# Patient Record
Sex: MALE | Race: WHITE
[De-identification: names, ages, dates, MRNs, and addresses within clinical notes are randomized per-mention and may not be internally consistent; named-entity substitution may affect disease eponyms.]

---

## 2019-09-27 ENCOUNTER — HOSPITAL ENCOUNTER (OUTPATIENT)
Dept: HOSPITAL 83 - LAB | Age: 69
Discharge: HOME | End: 2019-09-27
Attending: FAMILY MEDICINE
Payer: MEDICARE

## 2019-09-27 DIAGNOSIS — E78.2: ICD-10-CM

## 2019-09-27 DIAGNOSIS — I10: Primary | ICD-10-CM

## 2019-09-27 LAB
BUN SERPL-MCNC: 11 MG/DL (ref 7–24)
CHLORIDE SERPL-SCNC: 107 MMOL/L (ref 98–107)
CHOLEST SERPL-MCNC: 134 MG/DL (ref ?–200)
CREAT SERPL-MCNC: 1.08 MG/DL (ref 0.7–1.3)
HDLC SERPL-MCNC: 43 MG/DL (ref 40–60)
LDLC SERPL DIRECT ASSAY-MCNC: 61 MG/DL (ref 9–159)
POTASSIUM SERPL-SCNC: 3.6 MMOL/L (ref 3.5–5.1)
SODIUM SERPL-SCNC: 140 MMOL/L (ref 136–145)
TRIGL SERPL-MCNC: 149 MG/DL (ref ?–150)
VLDLC SERPL CALC-MCNC: 30 MG/DL (ref 6–40)

## 2021-04-20 ENCOUNTER — HOSPITAL ENCOUNTER (OUTPATIENT)
Dept: HOSPITAL 83 - LAB | Age: 71
Discharge: HOME | End: 2021-04-20
Attending: FAMILY MEDICINE
Payer: MEDICARE

## 2021-04-20 DIAGNOSIS — R53.83: ICD-10-CM

## 2021-04-20 DIAGNOSIS — I10: Primary | ICD-10-CM

## 2021-04-20 DIAGNOSIS — E78.2: ICD-10-CM

## 2021-04-20 DIAGNOSIS — R73.9: ICD-10-CM

## 2021-04-20 LAB
ALBUMIN SERPL-MCNC: 3.5 GM/DL (ref 3.1–4.5)
ALP SERPL-CCNC: 83 U/L (ref 45–117)
ALT SERPL W P-5'-P-CCNC: 28 U/L (ref 12–78)
AST SERPL-CCNC: 17 IU/L (ref 3–35)
BASOPHILS # BLD AUTO: 0.1 10*3/UL (ref 0–0.1)
BASOPHILS NFR BLD AUTO: 0.9 % (ref 0–1)
BUN SERPL-MCNC: 14 MG/DL (ref 7–24)
CHLORIDE SERPL-SCNC: 105 MMOL/L (ref 98–107)
CHOLEST SERPL-MCNC: 144 MG/DL (ref ?–200)
CREAT SERPL-MCNC: 1.09 MG/DL (ref 0.7–1.3)
EOSINOPHIL # BLD AUTO: 0.3 10*3/UL (ref 0–0.4)
EOSINOPHIL # BLD AUTO: 3.8 % (ref 1–4)
ERYTHROCYTE [DISTWIDTH] IN BLOOD BY AUTOMATED COUNT: 12.8 % (ref 0–14.5)
HCT VFR BLD AUTO: 39.4 % (ref 42–52)
HDLC SERPL-MCNC: 40 MG/DL (ref 40–60)
LDLC SERPL DIRECT ASSAY-MCNC: 32 MG/DL (ref 9–159)
LYMPHOCYTES # BLD AUTO: 1.9 10*3/UL (ref 1.3–4.4)
LYMPHOCYTES NFR BLD AUTO: 27.2 % (ref 27–41)
MCH RBC QN AUTO: 30.3 PG (ref 27–31)
MCHC RBC AUTO-ENTMCNC: 33.8 G/DL (ref 33–37)
MCV RBC AUTO: 89.7 FL (ref 80–94)
MONOCYTES # BLD AUTO: 0.6 10*3/UL (ref 0.1–1)
MONOCYTES NFR BLD MANUAL: 8.6 % (ref 3–9)
NEUT #: 4.1 10*3/UL (ref 2.3–7.9)
NEUT %: 59.2 % (ref 47–73)
NRBC BLD QL AUTO: 0 % (ref 0–0)
PLATELET # BLD AUTO: 212 10*3/UL (ref 130–400)
PMV BLD AUTO: 10.9 FL (ref 9.6–12.3)
POTASSIUM SERPL-SCNC: 3.4 MMOL/L (ref 3.5–5.1)
PROT SERPL-MCNC: 7.4 GM/DL (ref 6.4–8.2)
RBC # BLD AUTO: 4.39 10*6/UL (ref 4.5–5.9)
SODIUM SERPL-SCNC: 141 MMOL/L (ref 136–145)
TRIGL SERPL-MCNC: 362 MG/DL (ref ?–150)
TSH SERPL DL<=0.005 MIU/L-ACNC: 2.27 UIU/ML (ref 0.36–4.75)
VLDLC SERPL CALC-MCNC: 72 MG/DL (ref 6–40)
WBC NRBC COR # BLD AUTO: 6.8 10*3/UL (ref 4.8–10.8)

## 2021-05-04 ENCOUNTER — HOSPITAL ENCOUNTER (OUTPATIENT)
Dept: HOSPITAL 83 - US | Age: 71
Discharge: HOME | End: 2021-05-04
Attending: FAMILY MEDICINE
Payer: MEDICARE

## 2021-05-04 DIAGNOSIS — Z13.6: Primary | ICD-10-CM

## 2022-05-26 ENCOUNTER — HOSPITAL ENCOUNTER (OUTPATIENT)
Dept: HOSPITAL 83 - LAB | Age: 72
Discharge: HOME | End: 2022-05-26
Attending: FAMILY MEDICINE
Payer: MEDICARE

## 2022-05-26 DIAGNOSIS — I10: Primary | ICD-10-CM

## 2022-05-26 DIAGNOSIS — E78.2: ICD-10-CM

## 2022-05-26 LAB
BUN SERPL-MCNC: 17 MG/DL (ref 7–24)
CHLORIDE SERPL-SCNC: 111 MMOL/L (ref 98–107)
CHOLEST SERPL-MCNC: 118 MG/DL (ref ?–200)
CREAT SERPL-MCNC: 1.08 MG/DL (ref 0.7–1.3)
LDLC SERPL DIRECT ASSAY-MCNC: 32 MG/DL (ref 9–159)
POTASSIUM SERPL-SCNC: 4 MMOL/L (ref 3.5–5.1)
SODIUM SERPL-SCNC: 143 MMOL/L (ref 136–145)
TRIGL SERPL-MCNC: 202 MG/DL (ref ?–150)

## 2023-05-25 ENCOUNTER — HOSPITAL ENCOUNTER (OUTPATIENT)
Dept: HOSPITAL 83 - LAB | Age: 73
Discharge: HOME | End: 2023-05-25
Attending: FAMILY MEDICINE
Payer: MEDICARE

## 2023-05-25 DIAGNOSIS — I10: Primary | ICD-10-CM

## 2023-05-25 DIAGNOSIS — E78.2: ICD-10-CM

## 2023-05-25 LAB
BUN SERPL-MCNC: 13 MG/DL (ref 9–23)
CHLORIDE SERPL-SCNC: 106 MMOL/L (ref 98–107)
CHOLEST SERPL-MCNC: 116 MG/DL (ref ?–200)
LDLC SERPL DIRECT ASSAY-MCNC: 46 MG/DL (ref 9–159)
POTASSIUM SERPL-SCNC: 3.7 MMOL/L (ref 3.4–5.1)
TRIGL SERPL-MCNC: 137 MG/DL (ref ?–150)

## 2023-06-05 ENCOUNTER — HOSPITAL ENCOUNTER (OUTPATIENT)
Dept: HOSPITAL 83 - LAB | Age: 73
Discharge: HOME | End: 2023-06-05
Attending: FAMILY MEDICINE
Payer: MEDICARE

## 2023-06-05 DIAGNOSIS — R73.9: ICD-10-CM

## 2023-06-05 DIAGNOSIS — I10: Primary | ICD-10-CM

## 2023-06-05 LAB
BUN SERPL-MCNC: 16 MG/DL (ref 9–23)
CHLORIDE SERPL-SCNC: 107 MMOL/L (ref 98–107)
POTASSIUM SERPL-SCNC: 4.3 MMOL/L (ref 3.4–5.1)

## 2024-02-06 ENCOUNTER — APPOINTMENT (OUTPATIENT)
Dept: GENERAL RADIOLOGY | Age: 74
DRG: 460 | End: 2024-02-06
Payer: MEDICARE

## 2024-02-06 ENCOUNTER — APPOINTMENT (OUTPATIENT)
Dept: CT IMAGING | Age: 74
DRG: 460 | End: 2024-02-06
Payer: MEDICARE

## 2024-02-06 ENCOUNTER — HOSPITAL ENCOUNTER (EMERGENCY)
Dept: HOSPITAL 83 - ED | Age: 74
Discharge: ANOTHER HOSPITAL | End: 2024-02-06
Payer: MEDICARE

## 2024-02-06 ENCOUNTER — HOSPITAL ENCOUNTER (INPATIENT)
Age: 74
LOS: 5 days | Discharge: HOME | DRG: 460 | End: 2024-02-12
Attending: EMERGENCY MEDICINE | Admitting: SURGERY
Payer: MEDICARE

## 2024-02-06 VITALS — WEIGHT: 175 LBS | HEIGHT: 68.98 IN | BODY MASS INDEX: 25.92 KG/M2

## 2024-02-06 DIAGNOSIS — Z90.49: ICD-10-CM
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
## 2024-02-06 DIAGNOSIS — S63.004A: ICD-10-CM

## 2024-02-06 DIAGNOSIS — S32.018A: ICD-10-CM

## 2024-02-06 DIAGNOSIS — S32.000A COMPRESSION FRACTURE OF LUMBAR VERTEBRA, UNSPECIFIED LUMBAR VERTEBRAL LEVEL, INITIAL ENCOUNTER (HCC): ICD-10-CM

## 2024-02-06 DIAGNOSIS — M79.671: ICD-10-CM

## 2024-02-06 DIAGNOSIS — S62.101A CLOSED FRACTURE OF RIGHT WRIST, INITIAL ENCOUNTER: ICD-10-CM

## 2024-02-06 DIAGNOSIS — Z88.0: ICD-10-CM

## 2024-02-06 DIAGNOSIS — Z87.442: ICD-10-CM

## 2024-02-06 DIAGNOSIS — Z98.890: ICD-10-CM
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
## 2024-02-06 DIAGNOSIS — S52.514A: Primary | ICD-10-CM

## 2024-02-06 DIAGNOSIS — S09.90XA INJURY OF HEAD, INITIAL ENCOUNTER: Primary | ICD-10-CM
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
## 2024-02-06 LAB
ABO + RH BLD: NORMAL
ALBUMIN SERPL-MCNC: 4.3 G/DL (ref 3.5–5.2)
ALP SERPL-CCNC: 66 U/L (ref 46–116)
ALP SERPL-CCNC: 73 U/L (ref 40–129)
ALT SERPL W P-5'-P-CCNC: 27 U/L (ref 5–49)
ALT SERPL-CCNC: 30 U/L (ref 0–40)
AMPHET UR QL SCN: NEGATIVE
ANION GAP SERPL CALCULATED.3IONS-SCNC: 12 MMOL/L (ref 7–16)
APAP SERPL-MCNC: <5 UG/ML (ref 10–30)
APTT PPP: 23.6 SECONDS (ref 20–32.1)
ARM BAND NUMBER: NORMAL
AST SERPL-CCNC: 54 U/L (ref 0–39)
BACTERIA URNS QL MICRO: ABNORMAL
BARBITURATES UR QL SCN: NEGATIVE
BASOPHILS # BLD AUTO: 0.1 10*3/UL (ref 0–0.1)
BASOPHILS NFR BLD AUTO: 0.5 % (ref 0–1)
BENZODIAZ UR QL: NEGATIVE
BILIRUB SERPL-MCNC: 0.7 MG/DL (ref 0–1.2)
BILIRUB UR QL STRIP: NEGATIVE
BLOOD BANK SAMPLE EXPIRATION: NORMAL
BLOOD GROUP ANTIBODIES SERPL: NEGATIVE
BUN SERPL-MCNC: 13 MG/DL (ref 9–23)
BUN SERPL-MCNC: 16 MG/DL (ref 6–23)
BUPRENORPHINE UR QL: NEGATIVE
CALCIUM SERPL-MCNC: 8.7 MG/DL (ref 8.6–10.2)
CANNABINOIDS UR QL SCN: POSITIVE
CHLORIDE SERPL-SCNC: 105 MMOL/L (ref 98–107)
CHLORIDE SERPL-SCNC: 108 MMOL/L (ref 98–107)
CLARITY UR: CLEAR
CO2 SERPL-SCNC: 24 MMOL/L (ref 22–29)
COCAINE UR QL SCN: NEGATIVE
COLOR UR: YELLOW
CREAT SERPL-MCNC: 0.9 MG/DL (ref 0.7–1.2)
EOSINOPHIL # BLD AUTO: 0.2 10*3/UL (ref 0–0.4)
EOSINOPHIL # BLD AUTO: 1.2 % (ref 1–4)
ERYTHROCYTE [DISTWIDTH] IN BLOOD BY AUTOMATED COUNT: 12.9 % (ref 0–14.5)
ERYTHROCYTE [DISTWIDTH] IN BLOOD BY AUTOMATED COUNT: 12.9 % (ref 11.5–15)
ETHANOLAMINE SERPL-MCNC: <10 MG/DL
FENTANYL UR QL: POSITIVE
GFR SERPL CREATININE-BSD FRML MDRD: >60 ML/MIN/1.73M2
GLUCOSE SERPL-MCNC: 153 MG/DL (ref 74–99)
GLUCOSE UR STRIP-MCNC: NEGATIVE MG/DL
HCT VFR BLD AUTO: 35.3 % (ref 37–54)
HCT VFR BLD AUTO: 38.1 % (ref 42–52)
HGB BLD-MCNC: 12 G/DL (ref 12.5–16.5)
HGB UR QL STRIP.AUTO: ABNORMAL
INR PPP: 1.2
KETONES UR STRIP-MCNC: 15 MG/DL
LACTATE BLDV-SCNC: 1.4 MMOL/L (ref 0.5–2.2)
LEUKOCYTE ESTERASE UR QL STRIP: NEGATIVE
LIPASE SERPL-CCNC: 40 U/L (ref 12–53)
LYMPHOCYTES # BLD AUTO: 1.2 10*3/UL (ref 1.3–4.4)
LYMPHOCYTES NFR BLD AUTO: 9 % (ref 27–41)
MCH RBC QN AUTO: 29.9 PG (ref 26–35)
MCH RBC QN AUTO: 30 PG (ref 27–31)
MCHC RBC AUTO-ENTMCNC: 33.6 G/DL (ref 33–37)
MCHC RBC AUTO-ENTMCNC: 34 G/DL (ref 32–34.5)
MCV RBC AUTO: 87.8 FL (ref 80–99.9)
MCV RBC AUTO: 89.2 FL (ref 80–94)
METHADONE UR QL: NEGATIVE
MONOCYTES # BLD AUTO: 0.6 10*3/UL (ref 0.1–1)
MONOCYTES NFR BLD MANUAL: 4.2 % (ref 3–9)
NEUT #: 10.9 10*3/UL (ref 2.3–7.9)
NEUT %: 84.5 % (ref 47–73)
NITRITE UR QL STRIP: NEGATIVE
NRBC BLD QL AUTO: 0 % (ref 0–0)
OPIATES UR QL SCN: NEGATIVE
OXYCODONE UR QL SCN: NEGATIVE
PCP UR QL SCN: NEGATIVE
PH UR STRIP: 6 [PH] (ref 5–9)
PLATELET # BLD AUTO: 155 10*3/UL (ref 130–400)
PLATELET # BLD AUTO: 171 K/UL (ref 130–450)
PMV BLD AUTO: 11 FL (ref 7–12)
PMV BLD AUTO: 11 FL (ref 9.6–12.3)
POTASSIUM SERPL-SCNC: 3.4 MMOL/L (ref 3.4–5.1)
POTASSIUM SERPL-SCNC: 3.4 MMOL/L (ref 3.5–5)
PROT SERPL-MCNC: 6.8 GM/DL (ref 6–8)
PROT SERPL-MCNC: 6.9 G/DL (ref 6.4–8.3)
PROT UR STRIP-MCNC: ABNORMAL MG/DL
PROTHROMBIN TIME: 13 SEC (ref 9.3–12.4)
RBC # BLD AUTO: 4.02 M/UL (ref 3.8–5.8)
RBC # BLD AUTO: 4.27 10*6/UL (ref 4.5–5.9)
RBC #/AREA URNS HPF: ABNORMAL /HPF
SALICYLATES SERPL-MCNC: <0.3 MG/DL (ref 0–30)
SODIUM SERPL-SCNC: 141 MMOL/L (ref 132–146)
SP GR UR STRIP: 1.02 (ref 1–1.03)
TEST INFORMATION: ABNORMAL
TOXIC TRICYCLIC SC,BLOOD: NEGATIVE
UROBILINOGEN UR STRIP-ACNC: 0.2 EU/DL (ref 0–1)
WBC #/AREA URNS HPF: ABNORMAL /HPF
WBC NRBC COR # BLD AUTO: 13 10*3/UL (ref 4.8–10.8)
WBC OTHER # BLD: 12.3 K/UL (ref 4.5–11.5)

## 2024-02-06 PROCEDURE — G0480 DRUG TEST DEF 1-7 CLASSES: HCPCS

## 2024-02-06 PROCEDURE — 80053 COMPREHEN METABOLIC PANEL: CPT

## 2024-02-06 PROCEDURE — 96374 THER/PROPH/DIAG INJ IV PUSH: CPT

## 2024-02-06 PROCEDURE — 85027 COMPLETE CBC AUTOMATED: CPT

## 2024-02-06 PROCEDURE — 73110 X-RAY EXAM OF WRIST: CPT

## 2024-02-06 PROCEDURE — 83605 ASSAY OF LACTIC ACID: CPT

## 2024-02-06 PROCEDURE — 86850 RBC ANTIBODY SCREEN: CPT

## 2024-02-06 PROCEDURE — 72170 X-RAY EXAM OF PELVIS: CPT

## 2024-02-06 PROCEDURE — 80307 DRUG TEST PRSMV CHEM ANLYZR: CPT

## 2024-02-06 PROCEDURE — 86900 BLOOD TYPING SEROLOGIC ABO: CPT

## 2024-02-06 PROCEDURE — 85610 PROTHROMBIN TIME: CPT

## 2024-02-06 PROCEDURE — 73090 X-RAY EXAM OF FOREARM: CPT

## 2024-02-06 PROCEDURE — 86901 BLOOD TYPING SEROLOGIC RH(D): CPT

## 2024-02-06 PROCEDURE — 99285 EMERGENCY DEPT VISIT HI MDM: CPT

## 2024-02-06 PROCEDURE — 80179 DRUG ASSAY SALICYLATE: CPT

## 2024-02-06 PROCEDURE — 93005 ELECTROCARDIOGRAM TRACING: CPT

## 2024-02-06 PROCEDURE — 80143 DRUG ASSAY ACETAMINOPHEN: CPT

## 2024-02-06 PROCEDURE — 99223 1ST HOSP IP/OBS HIGH 75: CPT | Performed by: SURGERY

## 2024-02-06 PROCEDURE — 6370000000 HC RX 637 (ALT 250 FOR IP)

## 2024-02-06 PROCEDURE — 81001 URINALYSIS AUTO W/SCOPE: CPT

## 2024-02-06 PROCEDURE — 99221 1ST HOSP IP/OBS SF/LOW 40: CPT | Performed by: ORTHOPAEDIC SURGERY

## 2024-02-06 PROCEDURE — 71045 X-RAY EXAM CHEST 1 VIEW: CPT

## 2024-02-06 RX ORDER — PROPOFOL 10 MG/ML
INJECTION, EMULSION INTRAVENOUS
Status: DISPENSED
Start: 2024-02-06 | End: 2024-02-07

## 2024-02-06 RX ORDER — OXYCODONE HYDROCHLORIDE 5 MG/1
5 TABLET ORAL EVERY 4 HOURS PRN
Status: DISCONTINUED | OUTPATIENT
Start: 2024-02-06 | End: 2024-02-12 | Stop reason: HOSPADM

## 2024-02-06 RX ORDER — ACETAMINOPHEN 325 MG/1
650 TABLET ORAL EVERY 6 HOURS
Status: DISCONTINUED | OUTPATIENT
Start: 2024-02-06 | End: 2024-02-07

## 2024-02-06 RX ORDER — OXYCODONE HYDROCHLORIDE 10 MG/1
10 TABLET ORAL EVERY 4 HOURS PRN
Status: DISCONTINUED | OUTPATIENT
Start: 2024-02-06 | End: 2024-02-12 | Stop reason: HOSPADM

## 2024-02-06 RX ADMIN — OXYCODONE HYDROCHLORIDE 10 MG: 10 TABLET ORAL at 20:49

## 2024-02-07 ENCOUNTER — ANESTHESIA (OUTPATIENT)
Dept: OPERATING ROOM | Age: 74
End: 2024-02-07
Payer: MEDICARE

## 2024-02-07 ENCOUNTER — APPOINTMENT (OUTPATIENT)
Dept: GENERAL RADIOLOGY | Age: 74
DRG: 460 | End: 2024-02-07
Payer: MEDICARE

## 2024-02-07 ENCOUNTER — APPOINTMENT (OUTPATIENT)
Dept: CT IMAGING | Age: 74
DRG: 460 | End: 2024-02-07
Payer: MEDICARE

## 2024-02-07 ENCOUNTER — ANESTHESIA EVENT (OUTPATIENT)
Dept: OPERATING ROOM | Age: 74
End: 2024-02-07
Payer: MEDICARE

## 2024-02-07 PROBLEM — S99.921A RIGHT FOOT INJURY, INITIAL ENCOUNTER: Status: ACTIVE | Noted: 2024-02-07

## 2024-02-07 PROBLEM — S62.121A: Status: ACTIVE | Noted: 2024-02-07

## 2024-02-07 PROBLEM — S62.101A CLOSED FRACTURE OF RIGHT WRIST: Status: ACTIVE | Noted: 2024-02-07

## 2024-02-07 PROBLEM — T14.90XA TRAUMA: Status: ACTIVE | Noted: 2024-02-07

## 2024-02-07 PROBLEM — S09.90XA HEAD INJURY: Status: ACTIVE | Noted: 2024-02-07

## 2024-02-07 PROBLEM — S32.001A CLOSED BURST FRACTURE OF LUMBAR VERTEBRA (HCC): Status: ACTIVE | Noted: 2024-02-07

## 2024-02-07 LAB
ANION GAP SERPL CALCULATED.3IONS-SCNC: 10 MMOL/L (ref 7–16)
BUN SERPL-MCNC: 16 MG/DL (ref 6–23)
CALCIUM SERPL-MCNC: 8.1 MG/DL (ref 8.6–10.2)
CHLORIDE SERPL-SCNC: 105 MMOL/L (ref 98–107)
CO2 SERPL-SCNC: 23 MMOL/L (ref 22–29)
CREAT SERPL-MCNC: 0.8 MG/DL (ref 0.7–1.2)
EKG ATRIAL RATE: 74 BPM
EKG P AXIS: 38 DEGREES
EKG P-R INTERVAL: 150 MS
EKG Q-T INTERVAL: 448 MS
EKG QRS DURATION: 108 MS
EKG QTC CALCULATION (BAZETT): 497 MS
EKG R AXIS: 47 DEGREES
EKG T AXIS: 64 DEGREES
EKG VENTRICULAR RATE: 74 BPM
GFR SERPL CREATININE-BSD FRML MDRD: >60 ML/MIN/1.73M2
GLUCOSE SERPL-MCNC: 152 MG/DL (ref 74–99)
POTASSIUM SERPL-SCNC: 3.9 MMOL/L (ref 3.5–5)
SODIUM SERPL-SCNC: 138 MMOL/L (ref 132–146)

## 2024-02-07 PROCEDURE — 80048 BASIC METABOLIC PNL TOTAL CA: CPT

## 2024-02-07 PROCEDURE — 72125 CT NECK SPINE W/O DYE: CPT

## 2024-02-07 PROCEDURE — 6360000002 HC RX W HCPCS: Performed by: NEUROLOGICAL SURGERY

## 2024-02-07 PROCEDURE — 6370000000 HC RX 637 (ALT 250 FOR IP): Performed by: NEUROLOGICAL SURGERY

## 2024-02-07 PROCEDURE — 72128 CT CHEST SPINE W/O DYE: CPT

## 2024-02-07 PROCEDURE — 6370000000 HC RX 637 (ALT 250 FOR IP)

## 2024-02-07 PROCEDURE — 0SG1071 FUSION OF 2 OR MORE LUMBAR VERTEBRAL JOINTS WITH AUTOLOGOUS TISSUE SUBSTITUTE, POSTERIOR APPROACH, POSTERIOR COLUMN, OPEN APPROACH: ICD-10-PCS | Performed by: NEUROLOGICAL SURGERY

## 2024-02-07 PROCEDURE — 2580000003 HC RX 258

## 2024-02-07 PROCEDURE — 2500000003 HC RX 250 WO HCPCS

## 2024-02-07 PROCEDURE — 6360000002 HC RX W HCPCS

## 2024-02-07 PROCEDURE — 74177 CT ABD & PELVIS W/CONTRAST: CPT

## 2024-02-07 PROCEDURE — 7100000000 HC PACU RECOVERY - FIRST 15 MIN: Performed by: NEUROLOGICAL SURGERY

## 2024-02-07 PROCEDURE — 93010 ELECTROCARDIOGRAM REPORT: CPT | Performed by: INTERNAL MEDICINE

## 2024-02-07 PROCEDURE — 2720000010 HC SURG SUPPLY STERILE: Performed by: NEUROLOGICAL SURGERY

## 2024-02-07 PROCEDURE — A4217 STERILE WATER/SALINE, 500 ML: HCPCS | Performed by: NEUROLOGICAL SURGERY

## 2024-02-07 PROCEDURE — 95910 NRV CNDJ TEST 7-8 STUDIES: CPT | Performed by: AUDIOLOGIST

## 2024-02-07 PROCEDURE — 2580000003 HC RX 258: Performed by: NEUROLOGICAL SURGERY

## 2024-02-07 PROCEDURE — 72131 CT LUMBAR SPINE W/O DYE: CPT

## 2024-02-07 PROCEDURE — 22842 INSERT SPINE FIXATION DEVICE: CPT | Performed by: NEUROLOGICAL SURGERY

## 2024-02-07 PROCEDURE — C1713 ANCHOR/SCREW BN/BN,TIS/BN: HCPCS | Performed by: NEUROLOGICAL SURGERY

## 2024-02-07 PROCEDURE — 3600000015 HC SURGERY LEVEL 5 ADDTL 15MIN: Performed by: NEUROLOGICAL SURGERY

## 2024-02-07 PROCEDURE — 3700000000 HC ANESTHESIA ATTENDED CARE: Performed by: NEUROLOGICAL SURGERY

## 2024-02-07 PROCEDURE — 36415 COLL VENOUS BLD VENIPUNCTURE: CPT

## 2024-02-07 PROCEDURE — 7100000001 HC PACU RECOVERY - ADDTL 15 MIN: Performed by: NEUROLOGICAL SURGERY

## 2024-02-07 PROCEDURE — 99222 1ST HOSP IP/OBS MODERATE 55: CPT | Performed by: NEUROLOGICAL SURGERY

## 2024-02-07 PROCEDURE — 2500000003 HC RX 250 WO HCPCS: Performed by: ANESTHESIOLOGY

## 2024-02-07 PROCEDURE — 63005 REMOVE SPINE LAMINA 1/2 LMBR: CPT | Performed by: NEUROLOGICAL SURGERY

## 2024-02-07 PROCEDURE — 70450 CT HEAD/BRAIN W/O DYE: CPT

## 2024-02-07 PROCEDURE — 22614 ARTHRD PST TQ 1NTRSPC EA ADD: CPT | Performed by: NEUROLOGICAL SURGERY

## 2024-02-07 PROCEDURE — 0RGA071 FUSION OF THORACOLUMBAR VERTEBRAL JOINT WITH AUTOLOGOUS TISSUE SUBSTITUTE, POSTERIOR APPROACH, POSTERIOR COLUMN, OPEN APPROACH: ICD-10-PCS | Performed by: NEUROLOGICAL SURGERY

## 2024-02-07 PROCEDURE — 71260 CT THORAX DX C+: CPT

## 2024-02-07 PROCEDURE — 22614 ARTHRD PST TQ 1NTRSPC EA ADD: CPT | Performed by: PHYSICIAN ASSISTANT

## 2024-02-07 PROCEDURE — 3600000005 HC SURGERY LEVEL 5 BASE: Performed by: NEUROLOGICAL SURGERY

## 2024-02-07 PROCEDURE — 61783 SCAN PROC SPINAL: CPT | Performed by: NEUROLOGICAL SURGERY

## 2024-02-07 PROCEDURE — 0RG6071 FUSION OF THORACIC VERTEBRAL JOINT WITH AUTOLOGOUS TISSUE SUBSTITUTE, POSTERIOR APPROACH, POSTERIOR COLUMN, OPEN APPROACH: ICD-10-PCS | Performed by: NEUROLOGICAL SURGERY

## 2024-02-07 PROCEDURE — 2060000000 HC ICU INTERMEDIATE R&B

## 2024-02-07 PROCEDURE — 3700000001 HC ADD 15 MINUTES (ANESTHESIA): Performed by: NEUROLOGICAL SURGERY

## 2024-02-07 PROCEDURE — 2500000003 HC RX 250 WO HCPCS: Performed by: NEUROLOGICAL SURGERY

## 2024-02-07 PROCEDURE — 6360000004 HC RX CONTRAST MEDICATION: Performed by: RADIOLOGY

## 2024-02-07 PROCEDURE — C1729 CATH, DRAINAGE: HCPCS | Performed by: NEUROLOGICAL SURGERY

## 2024-02-07 PROCEDURE — 95940 IONM IN OPERATNG ROOM 15 MIN: CPT | Performed by: AUDIOLOGIST

## 2024-02-07 PROCEDURE — 73630 X-RAY EXAM OF FOOT: CPT

## 2024-02-07 PROCEDURE — 2709999900 HC NON-CHARGEABLE SUPPLY: Performed by: NEUROLOGICAL SURGERY

## 2024-02-07 PROCEDURE — 99231 SBSQ HOSP IP/OBS SF/LOW 25: CPT | Performed by: ORTHOPAEDIC SURGERY

## 2024-02-07 PROCEDURE — 22610 ARTHRD PST TQ 1NTRSPC THRC: CPT | Performed by: NEUROLOGICAL SURGERY

## 2024-02-07 PROCEDURE — 73200 CT UPPER EXTREMITY W/O DYE: CPT

## 2024-02-07 PROCEDURE — 22610 ARTHRD PST TQ 1NTRSPC THRC: CPT | Performed by: PHYSICIAN ASSISTANT

## 2024-02-07 DEVICE — CREO® THREADED 6.5 X 45MM POLYAXIAL SCREW
Type: IMPLANTABLE DEVICE | Site: SPINE LUMBAR | Status: FUNCTIONAL
Brand: CREO

## 2024-02-07 DEVICE — CREO® THREADED 6.5 X 40MM POLYAXIAL SCREW
Type: IMPLANTABLE DEVICE | Site: SPINE LUMBAR | Status: FUNCTIONAL
Brand: CREO

## 2024-02-07 DEVICE — SUBSTITUTE BNE GRFT 100 X 10 X 5 MM 5 CC DEMINERALIZED: Type: IMPLANTABLE DEVICE | Site: SPINE LUMBAR | Status: FUNCTIONAL

## 2024-02-07 DEVICE — THREADED LOCKING CAP, CREO
Type: IMPLANTABLE DEVICE | Site: SPINE LUMBAR | Status: FUNCTIONAL
Brand: CREO

## 2024-02-07 DEVICE — GRAFT BNE SUB 30CC 1.7-10MM CANC CHIP MORSELIZED FRZ DRY: Type: IMPLANTABLE DEVICE | Site: SPINE LUMBAR | Status: FUNCTIONAL

## 2024-02-07 DEVICE — CREO® THREADED 6.5 X 50MM POLYAXIAL SCREW
Type: IMPLANTABLE DEVICE | Site: SPINE LUMBAR | Status: FUNCTIONAL
Brand: CREO

## 2024-02-07 DEVICE — 5.5MM STRAIGHT ROD, TITANIUM ALLOY, 150MM LENGTH
Type: IMPLANTABLE DEVICE | Site: SPINE LUMBAR | Status: FUNCTIONAL
Brand: CREO

## 2024-02-07 RX ORDER — MORPHINE SULFATE 4 MG/ML
4 INJECTION, SOLUTION INTRAMUSCULAR; INTRAVENOUS
Status: DISCONTINUED | OUTPATIENT
Start: 2024-02-07 | End: 2024-02-08

## 2024-02-07 RX ORDER — KETAMINE HCL IN NACL, ISO-OSM 100MG/10ML
SYRINGE (ML) INJECTION PRN
Status: DISCONTINUED | OUTPATIENT
Start: 2024-02-07 | End: 2024-02-07 | Stop reason: SDUPTHER

## 2024-02-07 RX ORDER — SUCCINYLCHOLINE/SOD CL,ISO/PF 200MG/10ML
SYRINGE (ML) INTRAVENOUS PRN
Status: DISCONTINUED | OUTPATIENT
Start: 2024-02-07 | End: 2024-02-07 | Stop reason: SDUPTHER

## 2024-02-07 RX ORDER — ONDANSETRON 4 MG/1
4 TABLET, ORALLY DISINTEGRATING ORAL EVERY 8 HOURS PRN
Status: DISCONTINUED | OUTPATIENT
Start: 2024-02-07 | End: 2024-02-12 | Stop reason: HOSPADM

## 2024-02-07 RX ORDER — SODIUM CHLORIDE 0.9 % (FLUSH) 0.9 %
5-40 SYRINGE (ML) INJECTION EVERY 12 HOURS SCHEDULED
Status: DISCONTINUED | OUTPATIENT
Start: 2024-02-07 | End: 2024-02-12 | Stop reason: HOSPADM

## 2024-02-07 RX ORDER — ONDANSETRON 2 MG/ML
4 INJECTION INTRAMUSCULAR; INTRAVENOUS
Status: DISCONTINUED | OUTPATIENT
Start: 2024-02-07 | End: 2024-02-07 | Stop reason: HOSPADM

## 2024-02-07 RX ORDER — LIDOCAINE HYDROCHLORIDE AND EPINEPHRINE 5; 5 MG/ML; UG/ML
INJECTION, SOLUTION INFILTRATION; PERINEURAL PRN
Status: DISCONTINUED | OUTPATIENT
Start: 2024-02-07 | End: 2024-02-07 | Stop reason: ALTCHOICE

## 2024-02-07 RX ORDER — HYDROMORPHONE HYDROCHLORIDE 1 MG/ML
0.5 INJECTION, SOLUTION INTRAMUSCULAR; INTRAVENOUS; SUBCUTANEOUS EVERY 5 MIN PRN
Status: DISCONTINUED | OUTPATIENT
Start: 2024-02-07 | End: 2024-02-07 | Stop reason: HOSPADM

## 2024-02-07 RX ORDER — SODIUM CHLORIDE 9 MG/ML
INJECTION, SOLUTION INTRAVENOUS PRN
Status: DISCONTINUED | OUTPATIENT
Start: 2024-02-07 | End: 2024-02-07 | Stop reason: HOSPADM

## 2024-02-07 RX ORDER — OXYCODONE HYDROCHLORIDE 10 MG/1
10 TABLET ORAL EVERY 4 HOURS PRN
Status: DISCONTINUED | OUTPATIENT
Start: 2024-02-07 | End: 2024-02-07

## 2024-02-07 RX ORDER — ONDANSETRON 2 MG/ML
4 INJECTION INTRAMUSCULAR; INTRAVENOUS EVERY 6 HOURS PRN
Status: DISCONTINUED | OUTPATIENT
Start: 2024-02-07 | End: 2024-02-12 | Stop reason: HOSPADM

## 2024-02-07 RX ORDER — SODIUM CHLORIDE 0.9 % (FLUSH) 0.9 %
5-40 SYRINGE (ML) INJECTION PRN
Status: DISCONTINUED | OUTPATIENT
Start: 2024-02-07 | End: 2024-02-12 | Stop reason: HOSPADM

## 2024-02-07 RX ORDER — LIDOCAINE HYDROCHLORIDE 20 MG/ML
INJECTION, SOLUTION INTRAVENOUS PRN
Status: DISCONTINUED | OUTPATIENT
Start: 2024-02-07 | End: 2024-02-07 | Stop reason: SDUPTHER

## 2024-02-07 RX ORDER — SODIUM CHLORIDE 0.9 % (FLUSH) 0.9 %
5-40 SYRINGE (ML) INJECTION EVERY 12 HOURS SCHEDULED
Status: DISCONTINUED | OUTPATIENT
Start: 2024-02-07 | End: 2024-02-07 | Stop reason: HOSPADM

## 2024-02-07 RX ORDER — SENNA AND DOCUSATE SODIUM 50; 8.6 MG/1; MG/1
1 TABLET, FILM COATED ORAL 2 TIMES DAILY
Status: DISCONTINUED | OUTPATIENT
Start: 2024-02-07 | End: 2024-02-12 | Stop reason: HOSPADM

## 2024-02-07 RX ORDER — MIDAZOLAM HYDROCHLORIDE 1 MG/ML
INJECTION INTRAMUSCULAR; INTRAVENOUS PRN
Status: DISCONTINUED | OUTPATIENT
Start: 2024-02-07 | End: 2024-02-07 | Stop reason: SDUPTHER

## 2024-02-07 RX ORDER — POLYETHYLENE GLYCOL 3350 17 G/17G
17 POWDER, FOR SOLUTION ORAL DAILY
Status: DISCONTINUED | OUTPATIENT
Start: 2024-02-07 | End: 2024-02-12 | Stop reason: HOSPADM

## 2024-02-07 RX ORDER — SODIUM CHLORIDE, SODIUM LACTATE, POTASSIUM CHLORIDE, CALCIUM CHLORIDE 600; 310; 30; 20 MG/100ML; MG/100ML; MG/100ML; MG/100ML
INJECTION, SOLUTION INTRAVENOUS CONTINUOUS
Status: DISCONTINUED | OUTPATIENT
Start: 2024-02-07 | End: 2024-02-07

## 2024-02-07 RX ORDER — SODIUM CHLORIDE 0.9 % (FLUSH) 0.9 %
10 SYRINGE (ML) INJECTION EVERY 12 HOURS SCHEDULED
Status: DISCONTINUED | OUTPATIENT
Start: 2024-02-07 | End: 2024-02-12 | Stop reason: HOSPADM

## 2024-02-07 RX ORDER — METHOCARBAMOL 500 MG/1
1000 TABLET, FILM COATED ORAL 4 TIMES DAILY
Status: DISCONTINUED | OUTPATIENT
Start: 2024-02-07 | End: 2024-02-12 | Stop reason: HOSPADM

## 2024-02-07 RX ORDER — SODIUM CHLORIDE, SODIUM LACTATE, POTASSIUM CHLORIDE, CALCIUM CHLORIDE 600; 310; 30; 20 MG/100ML; MG/100ML; MG/100ML; MG/100ML
INJECTION, SOLUTION INTRAVENOUS CONTINUOUS PRN
Status: DISCONTINUED | OUTPATIENT
Start: 2024-02-07 | End: 2024-02-07 | Stop reason: SDUPTHER

## 2024-02-07 RX ORDER — ONDANSETRON 2 MG/ML
INJECTION INTRAMUSCULAR; INTRAVENOUS PRN
Status: DISCONTINUED | OUTPATIENT
Start: 2024-02-07 | End: 2024-02-07 | Stop reason: SDUPTHER

## 2024-02-07 RX ORDER — HEPARIN SODIUM 10000 [USP'U]/ML
5000 INJECTION, SOLUTION INTRAVENOUS; SUBCUTANEOUS EVERY 8 HOURS SCHEDULED
Status: DISCONTINUED | OUTPATIENT
Start: 2024-02-07 | End: 2024-02-07

## 2024-02-07 RX ORDER — SODIUM CHLORIDE 9 MG/ML
INJECTION, SOLUTION INTRAVENOUS PRN
Status: DISCONTINUED | OUTPATIENT
Start: 2024-02-07 | End: 2024-02-12 | Stop reason: HOSPADM

## 2024-02-07 RX ORDER — SODIUM CHLORIDE 0.9 % (FLUSH) 0.9 %
5-40 SYRINGE (ML) INJECTION PRN
Status: DISCONTINUED | OUTPATIENT
Start: 2024-02-07 | End: 2024-02-07 | Stop reason: HOSPADM

## 2024-02-07 RX ORDER — KETOROLAC TROMETHAMINE 30 MG/ML
15 INJECTION, SOLUTION INTRAMUSCULAR; INTRAVENOUS EVERY 6 HOURS PRN
Status: DISCONTINUED | OUTPATIENT
Start: 2024-02-07 | End: 2024-02-07

## 2024-02-07 RX ORDER — LABETALOL HYDROCHLORIDE 5 MG/ML
INJECTION, SOLUTION INTRAVENOUS PRN
Status: DISCONTINUED | OUTPATIENT
Start: 2024-02-07 | End: 2024-02-07 | Stop reason: SDUPTHER

## 2024-02-07 RX ORDER — VANCOMYCIN HYDROCHLORIDE 1 G/20ML
INJECTION, POWDER, LYOPHILIZED, FOR SOLUTION INTRAVENOUS PRN
Status: DISCONTINUED | OUTPATIENT
Start: 2024-02-07 | End: 2024-02-07 | Stop reason: ALTCHOICE

## 2024-02-07 RX ORDER — FENTANYL CITRATE 50 UG/ML
INJECTION, SOLUTION INTRAMUSCULAR; INTRAVENOUS PRN
Status: DISCONTINUED | OUTPATIENT
Start: 2024-02-07 | End: 2024-02-07 | Stop reason: SDUPTHER

## 2024-02-07 RX ORDER — HYDROMORPHONE HYDROCHLORIDE 1 MG/ML
0.25 INJECTION, SOLUTION INTRAMUSCULAR; INTRAVENOUS; SUBCUTANEOUS EVERY 5 MIN PRN
Status: DISCONTINUED | OUTPATIENT
Start: 2024-02-07 | End: 2024-02-07 | Stop reason: HOSPADM

## 2024-02-07 RX ORDER — DEXAMETHASONE SODIUM PHOSPHATE 10 MG/ML
INJECTION INTRAMUSCULAR; INTRAVENOUS PRN
Status: DISCONTINUED | OUTPATIENT
Start: 2024-02-07 | End: 2024-02-07 | Stop reason: SDUPTHER

## 2024-02-07 RX ORDER — ROCURONIUM BROMIDE 10 MG/ML
INJECTION, SOLUTION INTRAVENOUS PRN
Status: DISCONTINUED | OUTPATIENT
Start: 2024-02-07 | End: 2024-02-07 | Stop reason: SDUPTHER

## 2024-02-07 RX ORDER — BUPIVACAINE HYDROCHLORIDE 2.5 MG/ML
INJECTION, SOLUTION EPIDURAL; INFILTRATION; INTRACAUDAL PRN
Status: DISCONTINUED | OUTPATIENT
Start: 2024-02-07 | End: 2024-02-07 | Stop reason: ALTCHOICE

## 2024-02-07 RX ORDER — BACITRACIN ZINC 500 [USP'U]/G
OINTMENT TOPICAL PRN
Status: DISCONTINUED | OUTPATIENT
Start: 2024-02-07 | End: 2024-02-07 | Stop reason: ALTCHOICE

## 2024-02-07 RX ORDER — ACETAMINOPHEN 325 MG/1
650 TABLET ORAL EVERY 6 HOURS
Status: DISCONTINUED | OUTPATIENT
Start: 2024-02-07 | End: 2024-02-12 | Stop reason: HOSPADM

## 2024-02-07 RX ORDER — PHENYLEPHRINE HCL IN 0.9% NACL 1 MG/10 ML
SYRINGE (ML) INTRAVENOUS PRN
Status: DISCONTINUED | OUTPATIENT
Start: 2024-02-07 | End: 2024-02-07 | Stop reason: SDUPTHER

## 2024-02-07 RX ORDER — ONDANSETRON 4 MG/1
4 TABLET, ORALLY DISINTEGRATING ORAL EVERY 8 HOURS PRN
Status: DISCONTINUED | OUTPATIENT
Start: 2024-02-07 | End: 2024-02-07

## 2024-02-07 RX ORDER — ONDANSETRON 2 MG/ML
4 INJECTION INTRAMUSCULAR; INTRAVENOUS EVERY 6 HOURS PRN
Status: DISCONTINUED | OUTPATIENT
Start: 2024-02-07 | End: 2024-02-07

## 2024-02-07 RX ORDER — SODIUM CHLORIDE 0.9 % (FLUSH) 0.9 %
10 SYRINGE (ML) INJECTION PRN
Status: DISCONTINUED | OUTPATIENT
Start: 2024-02-07 | End: 2024-02-12 | Stop reason: HOSPADM

## 2024-02-07 RX ORDER — PROPOFOL 10 MG/ML
INJECTION, EMULSION INTRAVENOUS PRN
Status: DISCONTINUED | OUTPATIENT
Start: 2024-02-07 | End: 2024-02-07 | Stop reason: SDUPTHER

## 2024-02-07 RX ADMIN — ACETAMINOPHEN 650 MG: 325 TABLET ORAL at 19:57

## 2024-02-07 RX ADMIN — SODIUM CHLORIDE, PRESERVATIVE FREE 10 ML: 5 INJECTION INTRAVENOUS at 19:58

## 2024-02-07 RX ADMIN — ROCURONIUM BROMIDE 40 MG: 10 INJECTION, SOLUTION INTRAVENOUS at 10:05

## 2024-02-07 RX ADMIN — ACETAMINOPHEN 650 MG: 325 TABLET ORAL at 08:17

## 2024-02-07 RX ADMIN — Medication 100 MCG: at 11:23

## 2024-02-07 RX ADMIN — SODIUM CHLORIDE, POTASSIUM CHLORIDE, SODIUM LACTATE AND CALCIUM CHLORIDE: 600; 310; 30; 20 INJECTION, SOLUTION INTRAVENOUS at 04:51

## 2024-02-07 RX ADMIN — HYDROMORPHONE HYDROCHLORIDE 0.5 MG: 1 INJECTION, SOLUTION INTRAMUSCULAR; INTRAVENOUS; SUBCUTANEOUS at 08:18

## 2024-02-07 RX ADMIN — ACETAMINOPHEN 650 MG: 325 TABLET ORAL at 17:02

## 2024-02-07 RX ADMIN — SODIUM CHLORIDE, POTASSIUM CHLORIDE, SODIUM LACTATE AND CALCIUM CHLORIDE: 600; 310; 30; 20 INJECTION, SOLUTION INTRAVENOUS at 09:43

## 2024-02-07 RX ADMIN — LIDOCAINE HYDROCHLORIDE 40 MG: 20 INJECTION, SOLUTION INTRAVENOUS at 09:57

## 2024-02-07 RX ADMIN — HYDROMORPHONE HYDROCHLORIDE 0.5 MG: 1 INJECTION, SOLUTION INTRAMUSCULAR; INTRAVENOUS; SUBCUTANEOUS at 14:20

## 2024-02-07 RX ADMIN — IOPAMIDOL 75 ML: 755 INJECTION, SOLUTION INTRAVENOUS at 01:33

## 2024-02-07 RX ADMIN — Medication 200 MCG: at 11:47

## 2024-02-07 RX ADMIN — METHOCARBAMOL 1000 MG: 500 TABLET ORAL at 19:57

## 2024-02-07 RX ADMIN — SUGAMMADEX 200 MG: 100 INJECTION, SOLUTION INTRAVENOUS at 11:45

## 2024-02-07 RX ADMIN — ONDANSETRON HYDROCHLORIDE 4 MG: 2 SOLUTION INTRAMUSCULAR; INTRAVENOUS at 12:16

## 2024-02-07 RX ADMIN — WATER 2000 MG: 1 INJECTION INTRAMUSCULAR; INTRAVENOUS; SUBCUTANEOUS at 18:00

## 2024-02-07 RX ADMIN — ROCURONIUM BROMIDE 10 MG: 10 INJECTION, SOLUTION INTRAVENOUS at 09:57

## 2024-02-07 RX ADMIN — LABETALOL HYDROCHLORIDE 2.5 MG: 5 INJECTION INTRAVENOUS at 12:36

## 2024-02-07 RX ADMIN — METHOCARBAMOL 1000 MG: 500 TABLET ORAL at 17:02

## 2024-02-07 RX ADMIN — FENTANYL CITRATE 100 MCG: 0.05 INJECTION, SOLUTION INTRAMUSCULAR; INTRAVENOUS at 09:57

## 2024-02-07 RX ADMIN — SENNOSIDES AND DOCUSATE SODIUM 1 TABLET: 50; 8.6 TABLET ORAL at 19:57

## 2024-02-07 RX ADMIN — SODIUM CHLORIDE, SODIUM LACTATE, POTASSIUM CHLORIDE, CALCIUM CHLORIDE: 600; 310; 30; 20 INJECTION, SOLUTION INTRAVENOUS at 12:11

## 2024-02-07 RX ADMIN — Medication 200 MCG: at 11:59

## 2024-02-07 RX ADMIN — OXYCODONE 5 MG: 5 TABLET ORAL at 17:03

## 2024-02-07 RX ADMIN — FENTANYL CITRATE 50 MCG: 0.05 INJECTION, SOLUTION INTRAMUSCULAR; INTRAVENOUS at 11:01

## 2024-02-07 RX ADMIN — Medication 120 MG: at 09:57

## 2024-02-07 RX ADMIN — Medication 20 MG: at 10:51

## 2024-02-07 RX ADMIN — DEXAMETHASONE SODIUM PHOSPHATE 10 MG: 10 INJECTION INTRAMUSCULAR; INTRAVENOUS at 10:32

## 2024-02-07 RX ADMIN — FENTANYL CITRATE 50 MCG: 0.05 INJECTION, SOLUTION INTRAMUSCULAR; INTRAVENOUS at 10:55

## 2024-02-07 RX ADMIN — MORPHINE SULFATE 4 MG: 4 INJECTION, SOLUTION INTRAMUSCULAR; INTRAVENOUS at 19:58

## 2024-02-07 RX ADMIN — ONDANSETRON 4 MG: 2 INJECTION INTRAMUSCULAR; INTRAVENOUS at 08:18

## 2024-02-07 RX ADMIN — PROPOFOL 180 MG: 10 INJECTION, EMULSION INTRAVENOUS at 09:57

## 2024-02-07 RX ADMIN — SODIUM CHLORIDE, POTASSIUM CHLORIDE, SODIUM LACTATE AND CALCIUM CHLORIDE: 600; 310; 30; 20 INJECTION, SOLUTION INTRAVENOUS at 11:47

## 2024-02-07 RX ADMIN — HYDROMORPHONE HYDROCHLORIDE 0.5 MG: 1 INJECTION, SOLUTION INTRAMUSCULAR; INTRAVENOUS; SUBCUTANEOUS at 00:48

## 2024-02-07 RX ADMIN — MIDAZOLAM 2 MG: 1 INJECTION INTRAMUSCULAR; INTRAVENOUS at 10:51

## 2024-02-07 RX ADMIN — FENTANYL CITRATE 50 MCG: 0.05 INJECTION, SOLUTION INTRAMUSCULAR; INTRAVENOUS at 12:20

## 2024-02-07 RX ADMIN — METHOCARBAMOL 1000 MG: 500 TABLET ORAL at 08:17

## 2024-02-07 RX ADMIN — CEFAZOLIN 2000 MG: 2 INJECTION, POWDER, FOR SOLUTION INTRAMUSCULAR; INTRAVENOUS at 10:31

## 2024-02-07 RX ADMIN — Medication 100 MCG: at 11:30

## 2024-02-07 RX ADMIN — Medication 100 MCG: at 12:09

## 2024-02-07 RX ADMIN — HYDROMORPHONE HYDROCHLORIDE 0.5 MG: 1 INJECTION, SOLUTION INTRAMUSCULAR; INTRAVENOUS; SUBCUTANEOUS at 03:38

## 2024-02-07 RX ADMIN — SODIUM CHLORIDE, SODIUM LACTATE, POTASSIUM CHLORIDE, CALCIUM CHLORIDE: 600; 310; 30; 20 INJECTION, SOLUTION INTRAVENOUS at 10:03

## 2024-02-07 NOTE — H&P
lumbar and CT thoracic    Consultations: Neurosurgery and orthopedic surgery    Admission/Diagnosis: 73-year-old male status post mechanical fall with L1 vertebral body fracture with retropulsion and right lunate dislocation with a right styloid fracture      Plan of Treatment:  Tertiary exam  Neuro checks every 4 hours  Pain control  Send GlycoLax for bowel regimen  Neurosurgery consultation  Orthopedic surgery consultation  N.p.o. for possible neurosurgical intervention tomorrow  IVF's  Restart home meds  DC planning    Plan discussed with Dr. GURPREET Navarro at 2/7/2024 on 1:43 AM    Electronically signed by Fabrizio Larsen DO on 2/7/2024 at 1:43 AM

## 2024-02-07 NOTE — DISCHARGE SUMMARY
Physician Discharge Summary     Patient ID:  Kiel Choudhary  [id number]  73 y.o.  1950    Admit date: 2/6/2024    Discharge date and time: 2/12/24 @ 1230 pm     Admitting Physician: Eduardo Navarro MD     Admission Diagnoses: Trauma [T14.90XA]  Injury of head, initial encounter [S09.90XA]  Closed fracture of right wrist, initial encounter [S62.101A]  Compression fracture of lumbar vertebra, unspecified lumbar vertebral level, initial encounter ([facility]) [S32.000A]    Discharge Diagnoses: Principal Problem:    Trauma  Active Problems:    Closed burst fracture of lumbar vertebra ([facility])    Head injury    Right foot injury, initial encounter    Closed fracture dislocation of lunate bone of right wrist    Closed fracture of right wrist  Resolved Problems:    * No resolved hospital problems. *      Admission Condition: good    Discharged Condition: stable    Indication for Admission: Fall     Hospital Course/Procedures/Operation/treatments:   2/6: Admitted for L1 unstable fracture.  Neurovascular intact.  Neurosurgery consulted.  Orthopedics consulted for right hand fractures.  Reduction done in ED.  2/7: Awaiting neurosurgery recommendations.  Doing well overall.  Still neuromuscularly intact.  2/8:Doing well overall. Pain much improved. NO complains. Went with NSG to OR yesterday. Waiting on  TLSO.   2/9: Doing well overall. Pain much improved on medications. NO complains.  TLSO brace in place will remove Malik today and lumbar drain.  Will start DVT prophylaxis after lumbar drain is removed.  2/10-12: Patient underwent right lunate reduction and repair and pinning and right radial styloid pinning 2/11 with Ortho.  PT OT pending with his brace.  2/12: Worked with PT/OT, 18/24. Malik removed. Deemed medically stable for discharge home today.     Consults:   IP CONSULT TO TRAUMA SURGERY  IP CONSULT TO NEUROSURGERY  IP CONSULT TO ORTHOPEDIC SURGERY  INPATIENT CONSULT TO ORTHOTIST/PROSTHETIST    Significant Diagnostic Studies:

## 2024-02-07 NOTE — ANESTHESIA PRE PROCEDURE
pneumothorax.     ADDITIONAL FINDINGS: None.     ABDOMEN AND PELVIS:     LIVER: Suspect steatosis.     BILIARY: No biliary dilatation status post cholecystectomy.     SPLEEN: Unremarkable.     PANCREAS: Subtle 1.1 cm slightly hypoattenuating focus in the head on axial  image 135 of questionable significance.     ADRENALS: 3 cm left adrenal mass is somewhat low in attenuation (Hounsfield  of 17).     KIDNEYS: Symmetric enhancement.  No hydronephrosis.  Right renal cyst.  Tiny  nonobstructive calculus on the left.     GI: Small hiatal hernia.  There is an unusual appearing duodenal diverticulum  versus perhaps duplication cyst, with the appearance of a diverticulum within  a diverticulum.  No bowel obstruction.  Appendix is not identified.  Extensive colonic diverticulosis.  No pneumoperitoneum.     LYMPH NODES: No significant lymphadenopathy.     VESSELS: Abdominal aorta is normal in caliber. Mild atherosclerotic  calcification.     PELVIS: Prostate is markedly enlarged.     BONES: No aggressive osseous lesion.     ADDITIONAL FINDINGS: Small periumbilical hernia which contains fat and a  minimal portion of small bowel.     THORACOLUMBAR SPINE: Acute fracture L1 vertebral body involving all 3  columns.  Approximately 25% loss of height.  Approximately 5 mm of  retropulsion.  Fracture involves the right lamina and bilateral transverse  processes.  Multilevel degenerative changes of the spine.     IMPRESSION:  Acute L1 vertebral body fracture involving all 3 columns. Approximately 25%  loss of height and 5 mm of retropulsion.  Likely mild narrowing of the thecal  sac at this level.  Fracture involves the right lamina and bilateral  transverse processes.  Would question a small ventral epidural hematoma at  this level.     No acute intrathoracic process.     Left adrenal mass measuring 3 cm. This is somewhat low in attenuation and may  represent an adrenal adenoma.     Extensive colonic diverticulosis.     Small

## 2024-02-07 NOTE — ANESTHESIA PROCEDURE NOTES
Arterial Line:    An arterial line was placed using surface landmarks, in the OR for the following indication(s): continuous blood pressure monitoring and blood sampling needed.    A 20 gauge (size), 1 and 3/4 inch (length), Arrow (type) catheter was placed, Seldinger technique used, into the left radial artery, secured by tape and Tegaderm.  Anesthesia type: General    Events:  patient tolerated procedure well with no complications.2/7/2024 10:02 AM2/7/2024 10:06 AM  Anesthesiologist: Deandre Salinas DO  Other anesthesia staff: Randal Oglesby RN  Performed: Other anesthesia staff   Preanesthetic Checklist  Completed: patient identified, IV checked, site marked, risks and benefits discussed, surgical/procedural consents, equipment checked, pre-op evaluation, timeout performed, anesthesia consent given, oxygen available and monitors applied/VS acknowledged

## 2024-02-07 NOTE — FLOWSHEET NOTE
Pt presents to the ED as a trauma transfer from [facility] secondary to accidentally falling from a roof. Pt has known Fx to the RUE and lumbar spine. Pt is currently alert and oriented. Pt resting comfortably. Per EMS C spine cleared by sending facility. Pt pupils are equal and reactive bilaterally to light. Pt shows no other obvious signs of injury and has no further complaints at this time.

## 2024-02-07 NOTE — ED PROVIDER NOTES
HPI:  2/7/24, Time: 1:20 AM PATSY Choudhary is a 73 y.o. male presenting to the ED for fall.  Patient fell off a roof.  Did hit his head, no loss of conscious, does complain of right wrist pain and back pain.  Was seen in outside facility and transferred here for further evaluation.  He is on no anticoagulation.  Did not lose conscious.  No fevers or chills.  No other symptoms or complaints.      Review of Systems:   A complete review of systems was performed and pertinent positives and negatives are stated within HPI, all other systems reviewed and are negative.          --------------------------------------------- PAST HISTORY ---------------------------------------------  Past Medical History:  has no past medical history on file.    Past Surgical History:  has no past surgical history on file.    Social History:  reports that he has never smoked. He has never used smokeless tobacco. He reports that he does not currently use alcohol. He reports that he does not use drugs.    Family History: family history is not on file.     The patient’s home medications have been reviewed.    Allergies: Pcn [penicillins]    -------------------------------------------------- RESULTS -------------------------------------------------  All laboratory and radiology results have been personally reviewed by myself   LABS:  Results for orders placed or performed during the hospital encounter of 02/06/24   Protime-INR   Result Value Ref Range    Protime 13.0 (H) 9.3 - 12.4 sec    INR 1.2    Lactic Acid, Plasma   Result Value Ref Range    Lactic Acid 1.4 0.5 - 2.2 mmol/L   Drug screen multi urine   Result Value Ref Range    Amphetamine Screen, Ur NEGATIVE NEGATIVE    Barbiturate Screen, Ur NEGATIVE NEGATIVE    Benzodiazepine Screen, Urine NEGATIVE NEGATIVE    Cocaine Metabolite, Urine NEGATIVE NEGATIVE    Methadone Screen, Urine NEGATIVE NEGATIVE    Opiates, Urine NEGATIVE NEGATIVE    Phencyclidine, Urine NEGATIVE

## 2024-02-07 NOTE — ED NOTES
Per Dr. Larsen's request, Pt educated at this time to remain in a supine position with his head flat for spinal precautions. Pt confirmed understanding of education.

## 2024-02-08 LAB
ANION GAP SERPL CALCULATED.3IONS-SCNC: 10 MMOL/L (ref 7–16)
BASOPHILS # BLD: 0.01 K/UL (ref 0–0.2)
BASOPHILS NFR BLD: 0 % (ref 0–2)
BUN SERPL-MCNC: 18 MG/DL (ref 6–23)
CALCIUM SERPL-MCNC: 7.8 MG/DL (ref 8.6–10.2)
CHLORIDE SERPL-SCNC: 106 MMOL/L (ref 98–107)
CO2 SERPL-SCNC: 24 MMOL/L (ref 22–29)
CREAT SERPL-MCNC: 0.8 MG/DL (ref 0.7–1.2)
EOSINOPHIL # BLD: 0 K/UL (ref 0.05–0.5)
EOSINOPHILS RELATIVE PERCENT: 0 % (ref 0–6)
ERYTHROCYTE [DISTWIDTH] IN BLOOD BY AUTOMATED COUNT: 13.2 % (ref 11.5–15)
GFR SERPL CREATININE-BSD FRML MDRD: >60 ML/MIN/1.73M2
GLUCOSE SERPL-MCNC: 147 MG/DL (ref 74–99)
HCT VFR BLD AUTO: 26.8 % (ref 37–54)
HGB BLD-MCNC: 8.9 G/DL (ref 12.5–16.5)
IMM GRANULOCYTES # BLD AUTO: 0.06 K/UL (ref 0–0.58)
IMM GRANULOCYTES NFR BLD: 1 % (ref 0–5)
LYMPHOCYTES NFR BLD: 0.67 K/UL (ref 1.5–4)
LYMPHOCYTES RELATIVE PERCENT: 6 % (ref 20–42)
MCH RBC QN AUTO: 30 PG (ref 26–35)
MCHC RBC AUTO-ENTMCNC: 33.2 G/DL (ref 32–34.5)
MCV RBC AUTO: 90.2 FL (ref 80–99.9)
MONOCYTES NFR BLD: 0.99 K/UL (ref 0.1–0.95)
MONOCYTES NFR BLD: 8 % (ref 2–12)
NEUTROPHILS NFR BLD: 86 % (ref 43–80)
NEUTS SEG NFR BLD: 10.35 K/UL (ref 1.8–7.3)
PLATELET # BLD AUTO: 146 K/UL (ref 130–450)
PMV BLD AUTO: 11.5 FL (ref 7–12)
POTASSIUM SERPL-SCNC: 4 MMOL/L (ref 3.5–5)
RBC # BLD AUTO: 2.97 M/UL (ref 3.8–5.8)
SODIUM SERPL-SCNC: 140 MMOL/L (ref 132–146)
WBC OTHER # BLD: 12.1 K/UL (ref 4.5–11.5)

## 2024-02-08 PROCEDURE — 6370000000 HC RX 637 (ALT 250 FOR IP): Performed by: NEUROLOGICAL SURGERY

## 2024-02-08 PROCEDURE — 6360000002 HC RX W HCPCS: Performed by: NEUROLOGICAL SURGERY

## 2024-02-08 PROCEDURE — 85025 COMPLETE CBC W/AUTO DIFF WBC: CPT

## 2024-02-08 PROCEDURE — 80048 BASIC METABOLIC PNL TOTAL CA: CPT

## 2024-02-08 PROCEDURE — 2580000003 HC RX 258: Performed by: NEUROLOGICAL SURGERY

## 2024-02-08 PROCEDURE — 36415 COLL VENOUS BLD VENIPUNCTURE: CPT

## 2024-02-08 PROCEDURE — 6360000002 HC RX W HCPCS

## 2024-02-08 PROCEDURE — 99232 SBSQ HOSP IP/OBS MODERATE 35: CPT | Performed by: SURGERY

## 2024-02-08 PROCEDURE — 2060000000 HC ICU INTERMEDIATE R&B

## 2024-02-08 RX ORDER — BISACODYL 10 MG
10 SUPPOSITORY, RECTAL RECTAL DAILY
Status: DISCONTINUED | OUTPATIENT
Start: 2024-02-08 | End: 2024-02-12 | Stop reason: HOSPADM

## 2024-02-08 RX ADMIN — METHOCARBAMOL 1000 MG: 500 TABLET ORAL at 09:10

## 2024-02-08 RX ADMIN — SODIUM CHLORIDE, PRESERVATIVE FREE 10 ML: 5 INJECTION INTRAVENOUS at 19:31

## 2024-02-08 RX ADMIN — OXYCODONE HYDROCHLORIDE 10 MG: 10 TABLET ORAL at 01:31

## 2024-02-08 RX ADMIN — HYDROMORPHONE HYDROCHLORIDE 0.5 MG: 1 INJECTION, SOLUTION INTRAMUSCULAR; INTRAVENOUS; SUBCUTANEOUS at 19:30

## 2024-02-08 RX ADMIN — WATER 2000 MG: 1 INJECTION INTRAMUSCULAR; INTRAVENOUS; SUBCUTANEOUS at 10:42

## 2024-02-08 RX ADMIN — METHOCARBAMOL 1000 MG: 500 TABLET ORAL at 18:03

## 2024-02-08 RX ADMIN — OXYCODONE HYDROCHLORIDE 10 MG: 10 TABLET ORAL at 14:13

## 2024-02-08 RX ADMIN — ACETAMINOPHEN 650 MG: 325 TABLET ORAL at 09:11

## 2024-02-08 RX ADMIN — ACETAMINOPHEN 650 MG: 325 TABLET ORAL at 06:06

## 2024-02-08 RX ADMIN — METHOCARBAMOL 1000 MG: 500 TABLET ORAL at 19:30

## 2024-02-08 RX ADMIN — SENNOSIDES AND DOCUSATE SODIUM 1 TABLET: 50; 8.6 TABLET ORAL at 09:11

## 2024-02-08 RX ADMIN — OXYCODONE HYDROCHLORIDE 10 MG: 10 TABLET ORAL at 10:11

## 2024-02-08 RX ADMIN — OXYCODONE HYDROCHLORIDE 10 MG: 10 TABLET ORAL at 06:06

## 2024-02-08 RX ADMIN — OXYCODONE HYDROCHLORIDE 10 MG: 10 TABLET ORAL at 22:12

## 2024-02-08 RX ADMIN — ACETAMINOPHEN 650 MG: 325 TABLET ORAL at 22:12

## 2024-02-08 RX ADMIN — SODIUM CHLORIDE, PRESERVATIVE FREE 10 ML: 5 INJECTION INTRAVENOUS at 09:13

## 2024-02-08 RX ADMIN — ACETAMINOPHEN 650 MG: 325 TABLET ORAL at 18:04

## 2024-02-08 RX ADMIN — MORPHINE SULFATE 4 MG: 4 INJECTION, SOLUTION INTRAMUSCULAR; INTRAVENOUS at 00:21

## 2024-02-08 RX ADMIN — METHOCARBAMOL 1000 MG: 500 TABLET ORAL at 14:13

## 2024-02-08 RX ADMIN — WATER 2000 MG: 1 INJECTION INTRAMUSCULAR; INTRAVENOUS; SUBCUTANEOUS at 18:04

## 2024-02-08 RX ADMIN — SENNOSIDES AND DOCUSATE SODIUM 1 TABLET: 50; 8.6 TABLET ORAL at 19:30

## 2024-02-08 RX ADMIN — POLYETHYLENE GLYCOL 3350 17 GRAM ORAL POWDER PACKET 17 G: at 09:13

## 2024-02-08 RX ADMIN — OXYCODONE HYDROCHLORIDE 10 MG: 10 TABLET ORAL at 18:04

## 2024-02-08 RX ADMIN — WATER 2000 MG: 1 INJECTION INTRAMUSCULAR; INTRAVENOUS; SUBCUTANEOUS at 02:21

## 2024-02-08 RX ADMIN — HYDROMORPHONE HYDROCHLORIDE 0.5 MG: 1 INJECTION, SOLUTION INTRAMUSCULAR; INTRAVENOUS; SUBCUTANEOUS at 23:01

## 2024-02-08 NOTE — ANESTHESIA POSTPROCEDURE EVALUATION
Department of Anesthesiology  Postprocedure Note    Patient: Kiel Choudhary  MRN: [id number]  YOB: 1950  Date of evaluation: 2/7/2024    Procedure Summary     Date: 02/07/24 Room / Location: [address]    Anesthesia Start: 0943 Anesthesia Stop: 1251    Procedure: T11-L3 Posterior lumbar fusion (Spine Lumbar) Diagnosis:       Closed unstable burst fracture of first lumbar vertebra, initial encounter ([facility])      (Closed unstable burst fracture of first lumbar vertebra, initial encounter ([facility]) [S32.012A])    Surgeons: Byron Hoover MD Responsible Provider: Deandre Salinas DO    Anesthesia Type: General ASA Status: 3          Anesthesia Type: General    Jimenez Phase I: Jimenez Score: 8    Jimenez Phase II:      Anesthesia Post Evaluation    Patient location during evaluation: PACU  Patient participation: complete - patient participated  Level of consciousness: awake and alert  Pain score: 1  Airway patency: patent  Nausea & Vomiting: no nausea and no vomiting  Cardiovascular status: hemodynamically stable  Respiratory status: acceptable  Hydration status: euvolemic  Pain management: adequate        No notable events documented.

## 2024-02-08 NOTE — PLAN OF CARE
Problem: Discharge Planning  Goal: Discharge to home or other facility with appropriate resources  2/7/2024 2146 by Sabrina Carter, RN  Outcome: Progressing  Flowsheets (Taken 2/7/2024 1949)  Discharge to home or other facility with appropriate resources:   Identify barriers to discharge with patient and caregiver   Identify discharge learning needs (meds, wound care, etc)  2/7/2024 1824 by Zo Grossman, RN  Outcome: Progressing     Problem: Pain  Goal: Verbalizes/displays adequate comfort level or baseline comfort level  2/7/2024 2146 by Sabrina Carter, RN  Outcome: Progressing  2/7/2024 1824 by Zo Grossman RN  Outcome: Progressing     Problem: Safety - Adult  Goal: Free from fall injury  Outcome: Progressing     Problem: Skin/Tissue Integrity  Goal: Absence of new skin breakdown  Description: 1.  Monitor for areas of redness and/or skin breakdown  2.  Assess vascular access sites hourly  3.  Every 4-6 hours minimum:  Change oxygen saturation probe site  4.  Every 4-6 hours:  If on nasal continuous positive airway pressure, respiratory therapy assess nares and determine need for appliance change or resting period.  Outcome: Progressing     Problem: ABCDS Injury Assessment  Goal: Absence of physical injury  Outcome: Progressing

## 2024-02-08 NOTE — CARE COORDINATION
Spoke with patient with spouse at bedside. Patient is from home, lives with spouse. Prior to admission patient was completely independent. He did not use assistive devices, drives, recently retired. Patient does own a cane and electric scooter for outside the home. 1+1 step to enter through the front door. Bedroom on second floor 13 steps, bathroom on first floor. PCP is Dr. Marleny Koehler in [place]. No current homecare, no RUBIO history. Patient hopeful to return home. Therapy to see after TLSO delivered, will know better the plan after this.     Case Management Assessment  Initial Evaluation    Date/Time of Evaluation: 2/8/2024 12:48 PM  Assessment Completed by: JOEL Beltran    If patient is discharged prior to next notation, then this note serves as note for discharge by case management.    Patient Name: Kiel Choudhary                   YOB: 1950  Diagnosis: Trauma [T14.90XA]  Injury of head, initial encounter [S09.90XA]  Closed fracture of right wrist, initial encounter [S62.101A]  Compression fracture of lumbar vertebra, unspecified lumbar vertebral level, initial encounter ([facility]) [S32.000A]                   Date / Time: 2/6/2024  7:07 PM    Patient Admission Status: Inpatient   Readmission Risk (Low < 19, Mod (19-27), High > 27): Readmission Risk Score: 12.5    Current PCP: No primary care provider on file.  PCP verified by CM? Yes    Chart Reviewed: Yes      History Provided by: Patient, Spouse  Patient Orientation: Alert and Oriented    Patient Cognition: Alert    Hospitalization in the last 30 days (Readmission):  No    If yes, Readmission Assessment in  Navigator will be completed.    Advance Directives:      Code Status: Full Code   Patient's Primary Decision Maker is: Legal Next of Kin      Discharge Planning:    Patient lives with:   Type of Home:    Primary Care Giver: Self  Patient Support Systems include: Spouse/Significant Other   Current Financial resources:    Current

## 2024-02-09 ENCOUNTER — ANESTHESIA EVENT (OUTPATIENT)
Dept: OPERATING ROOM | Age: 74
End: 2024-02-09
Payer: MEDICARE

## 2024-02-09 LAB
ANION GAP SERPL CALCULATED.3IONS-SCNC: 10 MMOL/L (ref 7–16)
BASOPHILS # BLD: 0.03 K/UL (ref 0–0.2)
BASOPHILS NFR BLD: 0 % (ref 0–2)
BUN SERPL-MCNC: 20 MG/DL (ref 6–23)
CALCIUM SERPL-MCNC: 7.9 MG/DL (ref 8.6–10.2)
CHLORIDE SERPL-SCNC: 104 MMOL/L (ref 98–107)
CO2 SERPL-SCNC: 25 MMOL/L (ref 22–29)
CREAT SERPL-MCNC: 0.8 MG/DL (ref 0.7–1.2)
EOSINOPHIL # BLD: 0.14 K/UL (ref 0.05–0.5)
EOSINOPHILS RELATIVE PERCENT: 2 % (ref 0–6)
ERYTHROCYTE [DISTWIDTH] IN BLOOD BY AUTOMATED COUNT: 13.4 % (ref 11.5–15)
GFR SERPL CREATININE-BSD FRML MDRD: >60 ML/MIN/1.73M2
GLUCOSE BLD-MCNC: 158 MG/DL (ref 74–99)
GLUCOSE SERPL-MCNC: 117 MG/DL (ref 74–99)
HCT VFR BLD AUTO: 28 % (ref 37–54)
HGB BLD-MCNC: 9.1 G/DL (ref 12.5–16.5)
IMM GRANULOCYTES # BLD AUTO: 0.03 K/UL (ref 0–0.58)
IMM GRANULOCYTES NFR BLD: 0 % (ref 0–5)
LYMPHOCYTES NFR BLD: 1.14 K/UL (ref 1.5–4)
LYMPHOCYTES RELATIVE PERCENT: 12 % (ref 20–42)
MCH RBC QN AUTO: 29.6 PG (ref 26–35)
MCHC RBC AUTO-ENTMCNC: 32.5 G/DL (ref 32–34.5)
MCV RBC AUTO: 91.2 FL (ref 80–99.9)
MONOCYTES NFR BLD: 0.77 K/UL (ref 0.1–0.95)
MONOCYTES NFR BLD: 8 % (ref 2–12)
NEUTROPHILS NFR BLD: 78 % (ref 43–80)
NEUTS SEG NFR BLD: 7.35 K/UL (ref 1.8–7.3)
PLATELET # BLD AUTO: 144 K/UL (ref 130–450)
PMV BLD AUTO: 11.2 FL (ref 7–12)
POTASSIUM SERPL-SCNC: 3.9 MMOL/L (ref 3.5–5)
RBC # BLD AUTO: 3.07 M/UL (ref 3.8–5.8)
SODIUM SERPL-SCNC: 139 MMOL/L (ref 132–146)
WBC OTHER # BLD: 9.5 K/UL (ref 4.5–11.5)

## 2024-02-09 PROCEDURE — 6370000000 HC RX 637 (ALT 250 FOR IP): Performed by: NEUROLOGICAL SURGERY

## 2024-02-09 PROCEDURE — 36415 COLL VENOUS BLD VENIPUNCTURE: CPT

## 2024-02-09 PROCEDURE — 97535 SELF CARE MNGMENT TRAINING: CPT

## 2024-02-09 PROCEDURE — 6360000002 HC RX W HCPCS

## 2024-02-09 PROCEDURE — 97530 THERAPEUTIC ACTIVITIES: CPT

## 2024-02-09 PROCEDURE — 85025 COMPLETE CBC W/AUTO DIFF WBC: CPT

## 2024-02-09 PROCEDURE — 6370000000 HC RX 637 (ALT 250 FOR IP)

## 2024-02-09 PROCEDURE — 82962 GLUCOSE BLOOD TEST: CPT

## 2024-02-09 PROCEDURE — 2580000003 HC RX 258: Performed by: NEUROLOGICAL SURGERY

## 2024-02-09 PROCEDURE — 99232 SBSQ HOSP IP/OBS MODERATE 35: CPT | Performed by: SURGERY

## 2024-02-09 PROCEDURE — 97165 OT EVAL LOW COMPLEX 30 MIN: CPT

## 2024-02-09 PROCEDURE — 6360000002 HC RX W HCPCS: Performed by: NEUROLOGICAL SURGERY

## 2024-02-09 PROCEDURE — 80048 BASIC METABOLIC PNL TOTAL CA: CPT

## 2024-02-09 PROCEDURE — 2060000000 HC ICU INTERMEDIATE R&B

## 2024-02-09 PROCEDURE — 97161 PT EVAL LOW COMPLEX 20 MIN: CPT

## 2024-02-09 RX ORDER — LISINOPRIL 20 MG/1
20 TABLET ORAL DAILY
COMMUNITY

## 2024-02-09 RX ORDER — TADALAFIL 5 MG/1
5 TABLET ORAL DAILY
COMMUNITY

## 2024-02-09 RX ORDER — TADALAFIL 5 MG/1
5 TABLET ORAL DAILY
Status: DISCONTINUED | OUTPATIENT
Start: 2024-02-09 | End: 2024-02-11

## 2024-02-09 RX ORDER — TAMSULOSIN HYDROCHLORIDE 0.4 MG/1
0.4 CAPSULE ORAL DAILY
Status: DISCONTINUED | OUTPATIENT
Start: 2024-02-09 | End: 2024-02-12 | Stop reason: HOSPADM

## 2024-02-09 RX ORDER — SIMVASTATIN 40 MG
40 TABLET ORAL NIGHTLY
COMMUNITY

## 2024-02-09 RX ORDER — HYDROCHLOROTHIAZIDE 12.5 MG/1
12.5 TABLET ORAL DAILY
COMMUNITY

## 2024-02-09 RX ORDER — LACTULOSE 10 G/15ML
20 SOLUTION ORAL 3 TIMES DAILY
Status: COMPLETED | OUTPATIENT
Start: 2024-02-09 | End: 2024-02-10

## 2024-02-09 RX ORDER — HYDROCHLOROTHIAZIDE 25 MG/1
12.5 TABLET ORAL DAILY
Status: DISCONTINUED | OUTPATIENT
Start: 2024-02-09 | End: 2024-02-12 | Stop reason: HOSPADM

## 2024-02-09 RX ORDER — ATORVASTATIN CALCIUM 40 MG/1
40 TABLET, FILM COATED ORAL DAILY
Status: DISCONTINUED | OUTPATIENT
Start: 2024-02-09 | End: 2024-02-12 | Stop reason: HOSPADM

## 2024-02-09 RX ORDER — FINASTERIDE 5 MG/1
5 TABLET, FILM COATED ORAL DAILY
Status: DISCONTINUED | OUTPATIENT
Start: 2024-02-09 | End: 2024-02-12 | Stop reason: HOSPADM

## 2024-02-09 RX ORDER — TAMSULOSIN HYDROCHLORIDE 0.4 MG/1
0.4 CAPSULE ORAL DAILY
COMMUNITY

## 2024-02-09 RX ORDER — LISINOPRIL 20 MG/1
20 TABLET ORAL DAILY
Status: DISCONTINUED | OUTPATIENT
Start: 2024-02-09 | End: 2024-02-12 | Stop reason: HOSPADM

## 2024-02-09 RX ORDER — FINASTERIDE 5 MG/1
5 TABLET, FILM COATED ORAL DAILY
COMMUNITY

## 2024-02-09 RX ADMIN — OXYCODONE HYDROCHLORIDE 10 MG: 10 TABLET ORAL at 06:24

## 2024-02-09 RX ADMIN — ONDANSETRON 4 MG: 2 INJECTION INTRAMUSCULAR; INTRAVENOUS at 11:14

## 2024-02-09 RX ADMIN — OXYCODONE HYDROCHLORIDE 10 MG: 10 TABLET ORAL at 11:03

## 2024-02-09 RX ADMIN — WATER 2000 MG: 1 INJECTION INTRAMUSCULAR; INTRAVENOUS; SUBCUTANEOUS at 18:30

## 2024-02-09 RX ADMIN — SENNOSIDES AND DOCUSATE SODIUM 1 TABLET: 50; 8.6 TABLET ORAL at 20:47

## 2024-02-09 RX ADMIN — SODIUM CHLORIDE, PRESERVATIVE FREE 10 ML: 5 INJECTION INTRAVENOUS at 20:51

## 2024-02-09 RX ADMIN — SODIUM CHLORIDE, PRESERVATIVE FREE 10 ML: 5 INJECTION INTRAVENOUS at 20:47

## 2024-02-09 RX ADMIN — METHOCARBAMOL 1000 MG: 500 TABLET ORAL at 15:51

## 2024-02-09 RX ADMIN — SODIUM CHLORIDE, PRESERVATIVE FREE 10 ML: 5 INJECTION INTRAVENOUS at 08:14

## 2024-02-09 RX ADMIN — ATORVASTATIN CALCIUM 40 MG: 40 TABLET, FILM COATED ORAL at 20:46

## 2024-02-09 RX ADMIN — FINASTERIDE 5 MG: 5 TABLET, FILM COATED ORAL at 20:48

## 2024-02-09 RX ADMIN — WATER 2000 MG: 1 INJECTION INTRAMUSCULAR; INTRAVENOUS; SUBCUTANEOUS at 11:04

## 2024-02-09 RX ADMIN — HYDROCHLOROTHIAZIDE 12.5 MG: 25 TABLET ORAL at 11:10

## 2024-02-09 RX ADMIN — LACTULOSE 20 G: 20 SOLUTION ORAL at 20:46

## 2024-02-09 RX ADMIN — METHOCARBAMOL 1000 MG: 500 TABLET ORAL at 08:13

## 2024-02-09 RX ADMIN — OXYCODONE HYDROCHLORIDE 10 MG: 10 TABLET ORAL at 01:51

## 2024-02-09 RX ADMIN — ACETAMINOPHEN 650 MG: 325 TABLET ORAL at 20:47

## 2024-02-09 RX ADMIN — LACTULOSE 20 G: 20 SOLUTION ORAL at 15:52

## 2024-02-09 RX ADMIN — METHOCARBAMOL 1000 MG: 500 TABLET ORAL at 20:48

## 2024-02-09 RX ADMIN — LACTULOSE 20 G: 20 SOLUTION ORAL at 08:14

## 2024-02-09 RX ADMIN — SENNOSIDES AND DOCUSATE SODIUM 1 TABLET: 50; 8.6 TABLET ORAL at 08:13

## 2024-02-09 RX ADMIN — ACETAMINOPHEN 650 MG: 325 TABLET ORAL at 11:03

## 2024-02-09 RX ADMIN — ACETAMINOPHEN 650 MG: 325 TABLET ORAL at 15:52

## 2024-02-09 RX ADMIN — TAMSULOSIN HYDROCHLORIDE 0.4 MG: 0.4 CAPSULE ORAL at 20:52

## 2024-02-09 RX ADMIN — HYDROMORPHONE HYDROCHLORIDE 0.5 MG: 1 INJECTION, SOLUTION INTRAMUSCULAR; INTRAVENOUS; SUBCUTANEOUS at 03:26

## 2024-02-09 RX ADMIN — ACETAMINOPHEN 650 MG: 325 TABLET ORAL at 03:26

## 2024-02-09 RX ADMIN — OXYCODONE HYDROCHLORIDE 10 MG: 10 TABLET ORAL at 23:59

## 2024-02-09 RX ADMIN — POLYETHYLENE GLYCOL 3350 17 GRAM ORAL POWDER PACKET 17 G: at 08:14

## 2024-02-09 RX ADMIN — POTASSIUM BICARBONATE 20 MEQ: 782 TABLET, EFFERVESCENT ORAL at 11:04

## 2024-02-09 RX ADMIN — WATER 2000 MG: 1 INJECTION INTRAMUSCULAR; INTRAVENOUS; SUBCUTANEOUS at 01:51

## 2024-02-09 RX ADMIN — LISINOPRIL 20 MG: 20 TABLET ORAL at 11:10

## 2024-02-09 RX ADMIN — OXYCODONE HYDROCHLORIDE 10 MG: 10 TABLET ORAL at 20:00

## 2024-02-09 RX ADMIN — BISACODYL 10 MG: 10 SUPPOSITORY RECTAL at 11:10

## 2024-02-09 RX ADMIN — SODIUM CHLORIDE, PRESERVATIVE FREE 10 ML: 5 INJECTION INTRAVENOUS at 11:06

## 2024-02-09 NOTE — DISCHARGE INSTRUCTIONS
TRAUMA SERVICES DISCHARGE INSTRUCTIONS    Call [phone], option 2, for any questions/concerns and for follow-up appointment in 2 week(s).    Please follow the instructions checked below:    During the course of your workup, we identified an incidental finding of:  1.1 cm pancreatic head lesion, enlarged prostate, left adrenal mass  Please follow-up with your primary care provider.    ACTIVITY INSTRUCTIONS  Increase activity as tolerated  No heavy lifting or strenuous activity  Take your incentive spirometer home and use 4-6 times/day   [x]  No driving until cleared by trauma, orthopedic surgery    WOUND/DRESSING INSTRUCTIONS:  You may shower.  No sitting in bath tub, hot tub or swimming until cleared by physician.  Ice to areas of pain for first 24 hours.  Heat to areas of pain after that.  Wash areas of lacerations/abrasions with soap & water.  Rinse well.  Pat dry with clean towel.  Apply thin layer of Bacitracin, Neosporin, or triple antibiotic cream to affected area 2-3 times per day.  Keep wounds clean and dry.    MEDICATION INSTRUCTIONS  Take medication as prescribed.  When taking pain medications, you may experience dizziness or drowsiness.  Do not drink alcohol or drive when taking these medications.  You may experience constipation while taking pain medication.  You may take over the counter stool softeners such as docusate (Colace), sennosides S (Senokot-S), or Miralax.   [x]  You may take Ibuprofen (over the counter) as directed for mild pain.     --You may take up to 800mg every 8 hours for pain, please take with food or milk.   [x]  You may take acetaminophen (Tylenol) products.  Do NOT take more than 4000mg of Tylenol in 24h.   []  Do not take any other acetaminophen (Tylenol) products if you are taking Percocet or Norco, as these contain Tylenol.   --Do NOT take more than 4000mg of Tylenol in 24h.    OPIOID MEDICATION INSTRUCTIONS  Read the medication guide that is included with your

## 2024-02-09 NOTE — ACP (ADVANCE CARE PLANNING)
Advance Care Planning   Healthcare Decision Maker:    Primary Decision Maker: Wil carmichael [facility] - [phone]    Click here to complete Healthcare Decision Makers including selection of the Healthcare Decision Maker Relationship (ie \"Primary\").

## 2024-02-09 NOTE — CONSULTS
[facility]                  [address] [place] [address]                                  CONSULTATION    PATIENT NAME: MIGUEL CLEMONS                     :        1950  MED REC NO:   [id number]                            ROOM:       22  ACCOUNT NO:   [id number]                           ADMIT DATE: 2024  PROVIDER:     Byron Hoover MD    CONSULT DATE:  2024    REASON FOR CONSULTATION:  L1 burst fracture.    HISTORY OF PRESENT ILLNESS:  The patient is a 73-year-old gentleman who  apparently fell off a roof yesterday.  Immediately after the fall, he  was complaining of right upper extremity pain and back pain.  He denied  any new numbness, tingling or weakness or loss of control of bowel or  bladder function.  He was subsequently brought to the emergency room  where a CT scan of his lumbar spine showed an L1 burst fracture.  As a  result of that, Neurosurgery Service was consulted.    PAST MEDICAL HISTORY:  Positive for hypertension and hyperlipidemia.    ALLERGIES:  Include PENICILLIN.    SOCIAL HISTORY:  Negative for tobacco or alcohol use.    FAMILY HISTORY:  Noncontributory.    PAST SURGICAL HISTORY:  Positive for cholecystectomy.    REVIEW OF SYSTEMS:  HEENT:  Negative for headache, double vision, blurry  vision.  CARDIOVASCULAR:  Negative for chest pain, arrhythmia or  palpitations.  RESPIRATORY:  Negative for shortness of breath, asthma,  bronchitis or pneumonia.  GASTROINTESTINAL:  Negative for heartburn,  nausea, vomiting, diarrhea or constipation.  GENITOURINARY:  Negative  for hematuria or dysuria.  HEMATOLOGIC:  Negative for easy bleeding or  bruising.  INFECTIOUS:  Negative for any recent infection.   MUSCULOSKELETAL:  Positive for back pain.  PSYCHIATRIC:  Negative  anxiety or depression.  NEUROLOGIC:  Negative for seizure or stroke.   ENDOCRINE:  Negative for thyroid disorder or diabetes.    PHYSICAL EXAMINATION:  VITAL 
Consult was sent and has been read  
  Note: This report was completed using [employer] voiced recognition software.  Every effort has been made to ensure accuracy; however, inadvertent computerized transcription errors may be present.      
noted.  CT scan of the left wrist demonstrating a dorsal andrei fracture off the dorsal aspect of the lunate and triquetrum, and a previous radial styloid fracture.    IMPRESSION:   Closed, right perilunate dislocation  Closed, right radial styloid distal radius fracture    PLAN:  Continue nonweightbearing to the right upper extremity, continue sugar-tong splint  Discussed nonoperative and surgical indications for patient today.  Patient has a period limiting dislocation with instability of the lunate.  Discussed the need for operative fixation for multiple ligament repair and carpal stabilization.  Discussed right wrist SL ligament repair with internal bracing, and LT ligament repair.  Discussed postoperative protocol.  Patient wished to proceed with surgical intervention.  Will plan for surgical intervention on 2/11/2024.  N.p.o. after midnight on 2/10/24    I have explained the risks and complications of the recommended surgery with the patient at length, as well as discussed potential treatment alternatives including nonoperative management. These risks include but are not limited to death or complication from anesthesia, continued pain, nerve tendon or vascular injury, infection, hematoma, deep vein thrombosis or pulmonary embolism, and need for further surgery, etc. Patient understood this, asked appropriate questions, which were all answered, and elected to proceed with the procedure.     Electronically signed by Bashir Soto DO on 2/9/24 at 3:47 PM EST

## 2024-02-09 NOTE — CARE COORDINATION
Patient hopeful to return home but need therapy evals once TLSO is delivered. Will follow up and confirm after this. Spouse at bedside very supportive.     1540 Spoke with patient and spouse at bedside. Spouse told me he did work with therapy today, only able to go bed to chair. Discussed that he very likely needs inpatient rehab at discharge. RUBIO list left at bedside. She wants to review options with spouse this weekend and will let me know preferred choices on Monday.     For questions I can be reached at [phone]. JOEL Beltran

## 2024-02-10 LAB
ANION GAP SERPL CALCULATED.3IONS-SCNC: 12 MMOL/L (ref 7–16)
BASOPHILS # BLD: 0.01 K/UL (ref 0–0.2)
BASOPHILS NFR BLD: 0 % (ref 0–2)
BUN SERPL-MCNC: 19 MG/DL (ref 6–23)
CALCIUM SERPL-MCNC: 8.3 MG/DL (ref 8.6–10.2)
CHLORIDE SERPL-SCNC: 98 MMOL/L (ref 98–107)
CO2 SERPL-SCNC: 26 MMOL/L (ref 22–29)
CREAT SERPL-MCNC: 0.7 MG/DL (ref 0.7–1.2)
EOSINOPHIL # BLD: 0.01 K/UL (ref 0.05–0.5)
EOSINOPHILS RELATIVE PERCENT: 0 % (ref 0–6)
ERYTHROCYTE [DISTWIDTH] IN BLOOD BY AUTOMATED COUNT: 13.2 % (ref 11.5–15)
GFR SERPL CREATININE-BSD FRML MDRD: >60 ML/MIN/1.73M2
GLUCOSE SERPL-MCNC: 153 MG/DL (ref 74–99)
HCT VFR BLD AUTO: 30.6 % (ref 37–54)
HGB BLD-MCNC: 10.3 G/DL (ref 12.5–16.5)
IMM GRANULOCYTES # BLD AUTO: 0.06 K/UL (ref 0–0.58)
IMM GRANULOCYTES NFR BLD: 1 % (ref 0–5)
LYMPHOCYTES NFR BLD: 0.7 K/UL (ref 1.5–4)
LYMPHOCYTES RELATIVE PERCENT: 6 % (ref 20–42)
MAGNESIUM SERPL-MCNC: 2.3 MG/DL (ref 1.6–2.6)
MCH RBC QN AUTO: 29.7 PG (ref 26–35)
MCHC RBC AUTO-ENTMCNC: 33.7 G/DL (ref 32–34.5)
MCV RBC AUTO: 88.2 FL (ref 80–99.9)
MONOCYTES NFR BLD: 0.65 K/UL (ref 0.1–0.95)
MONOCYTES NFR BLD: 6 % (ref 2–12)
NEUTROPHILS NFR BLD: 88 % (ref 43–80)
NEUTS SEG NFR BLD: 10.47 K/UL (ref 1.8–7.3)
PLATELET # BLD AUTO: 219 K/UL (ref 130–450)
PMV BLD AUTO: 11 FL (ref 7–12)
POTASSIUM SERPL-SCNC: 3.4 MMOL/L (ref 3.5–5)
RBC # BLD AUTO: 3.47 M/UL (ref 3.8–5.8)
SODIUM SERPL-SCNC: 136 MMOL/L (ref 132–146)
WBC OTHER # BLD: 11.9 K/UL (ref 4.5–11.5)

## 2024-02-10 PROCEDURE — 80048 BASIC METABOLIC PNL TOTAL CA: CPT

## 2024-02-10 PROCEDURE — 6360000002 HC RX W HCPCS: Performed by: NEUROLOGICAL SURGERY

## 2024-02-10 PROCEDURE — 83735 ASSAY OF MAGNESIUM: CPT

## 2024-02-10 PROCEDURE — 36415 COLL VENOUS BLD VENIPUNCTURE: CPT

## 2024-02-10 PROCEDURE — 6370000000 HC RX 637 (ALT 250 FOR IP): Performed by: NEUROLOGICAL SURGERY

## 2024-02-10 PROCEDURE — 85025 COMPLETE CBC W/AUTO DIFF WBC: CPT

## 2024-02-10 PROCEDURE — 6370000000 HC RX 637 (ALT 250 FOR IP)

## 2024-02-10 PROCEDURE — 99232 SBSQ HOSP IP/OBS MODERATE 35: CPT | Performed by: SURGERY

## 2024-02-10 PROCEDURE — 2580000003 HC RX 258: Performed by: NEUROLOGICAL SURGERY

## 2024-02-10 PROCEDURE — 6360000002 HC RX W HCPCS

## 2024-02-10 PROCEDURE — 2060000000 HC ICU INTERMEDIATE R&B

## 2024-02-10 RX ORDER — HEPARIN SODIUM 10000 [USP'U]/ML
5000 INJECTION, SOLUTION INTRAVENOUS; SUBCUTANEOUS EVERY 8 HOURS
Status: DISCONTINUED | OUTPATIENT
Start: 2024-02-10 | End: 2024-02-12 | Stop reason: HOSPADM

## 2024-02-10 RX ADMIN — OXYCODONE HYDROCHLORIDE 10 MG: 10 TABLET ORAL at 03:57

## 2024-02-10 RX ADMIN — ACETAMINOPHEN 650 MG: 325 TABLET ORAL at 14:19

## 2024-02-10 RX ADMIN — TAMSULOSIN HYDROCHLORIDE 0.4 MG: 0.4 CAPSULE ORAL at 21:46

## 2024-02-10 RX ADMIN — HYDROMORPHONE HYDROCHLORIDE 0.5 MG: 1 INJECTION, SOLUTION INTRAMUSCULAR; INTRAVENOUS; SUBCUTANEOUS at 19:34

## 2024-02-10 RX ADMIN — METHOCARBAMOL 1000 MG: 500 TABLET ORAL at 08:57

## 2024-02-10 RX ADMIN — HYDROMORPHONE HYDROCHLORIDE 0.5 MG: 1 INJECTION, SOLUTION INTRAMUSCULAR; INTRAVENOUS; SUBCUTANEOUS at 22:54

## 2024-02-10 RX ADMIN — BISACODYL 10 MG: 10 SUPPOSITORY RECTAL at 09:00

## 2024-02-10 RX ADMIN — OXYCODONE HYDROCHLORIDE 10 MG: 10 TABLET ORAL at 21:29

## 2024-02-10 RX ADMIN — LACTULOSE 20 G: 20 SOLUTION ORAL at 14:20

## 2024-02-10 RX ADMIN — HYDROCHLOROTHIAZIDE 12.5 MG: 25 TABLET ORAL at 08:58

## 2024-02-10 RX ADMIN — WATER 2000 MG: 1 INJECTION INTRAMUSCULAR; INTRAVENOUS; SUBCUTANEOUS at 01:59

## 2024-02-10 RX ADMIN — METHOCARBAMOL 1000 MG: 500 TABLET ORAL at 14:19

## 2024-02-10 RX ADMIN — OXYCODONE HYDROCHLORIDE 10 MG: 10 TABLET ORAL at 17:17

## 2024-02-10 RX ADMIN — ACETAMINOPHEN 650 MG: 325 TABLET ORAL at 03:57

## 2024-02-10 RX ADMIN — ACETAMINOPHEN 650 MG: 325 TABLET ORAL at 10:57

## 2024-02-10 RX ADMIN — POTASSIUM BICARBONATE 40 MEQ: 782 TABLET, EFFERVESCENT ORAL at 14:20

## 2024-02-10 RX ADMIN — SENNOSIDES AND DOCUSATE SODIUM 1 TABLET: 50; 8.6 TABLET ORAL at 08:58

## 2024-02-10 RX ADMIN — METHOCARBAMOL 1000 MG: 500 TABLET ORAL at 21:47

## 2024-02-10 RX ADMIN — METHOCARBAMOL 1000 MG: 500 TABLET ORAL at 17:17

## 2024-02-10 RX ADMIN — LISINOPRIL 20 MG: 20 TABLET ORAL at 08:58

## 2024-02-10 RX ADMIN — SODIUM CHLORIDE, PRESERVATIVE FREE 10 ML: 5 INJECTION INTRAVENOUS at 09:02

## 2024-02-10 RX ADMIN — METHOCARBAMOL 1000 MG: 500 TABLET ORAL at 00:00

## 2024-02-10 RX ADMIN — SODIUM CHLORIDE, PRESERVATIVE FREE 10 ML: 5 INJECTION INTRAVENOUS at 21:52

## 2024-02-10 RX ADMIN — POLYETHYLENE GLYCOL 3350 17 GRAM ORAL POWDER PACKET 17 G: at 08:59

## 2024-02-10 RX ADMIN — ACETAMINOPHEN 650 MG: 325 TABLET ORAL at 21:47

## 2024-02-10 RX ADMIN — FINASTERIDE 5 MG: 5 TABLET, FILM COATED ORAL at 21:47

## 2024-02-10 RX ADMIN — SODIUM CHLORIDE, PRESERVATIVE FREE 10 ML: 5 INJECTION INTRAVENOUS at 09:03

## 2024-02-10 RX ADMIN — WATER 2000 MG: 1 INJECTION INTRAMUSCULAR; INTRAVENOUS; SUBCUTANEOUS at 10:57

## 2024-02-10 RX ADMIN — SENNOSIDES AND DOCUSATE SODIUM 1 TABLET: 50; 8.6 TABLET ORAL at 21:46

## 2024-02-10 RX ADMIN — LACTULOSE 20 G: 20 SOLUTION ORAL at 21:46

## 2024-02-10 RX ADMIN — LACTULOSE 20 G: 20 SOLUTION ORAL at 08:59

## 2024-02-10 RX ADMIN — ATORVASTATIN CALCIUM 40 MG: 40 TABLET, FILM COATED ORAL at 21:46

## 2024-02-11 ENCOUNTER — ANESTHESIA (OUTPATIENT)
Dept: OPERATING ROOM | Age: 74
End: 2024-02-11
Payer: MEDICARE

## 2024-02-11 ENCOUNTER — APPOINTMENT (OUTPATIENT)
Dept: GENERAL RADIOLOGY | Age: 74
DRG: 460 | End: 2024-02-11
Payer: MEDICARE

## 2024-02-11 LAB
ANION GAP SERPL CALCULATED.3IONS-SCNC: 12 MMOL/L (ref 7–16)
BASOPHILS # BLD: 0.02 K/UL (ref 0–0.2)
BASOPHILS NFR BLD: 0 % (ref 0–2)
BUN SERPL-MCNC: 20 MG/DL (ref 6–23)
CALCIUM SERPL-MCNC: 8.6 MG/DL (ref 8.6–10.2)
CHLORIDE SERPL-SCNC: 100 MMOL/L (ref 98–107)
CO2 SERPL-SCNC: 25 MMOL/L (ref 22–29)
CREAT SERPL-MCNC: 0.6 MG/DL (ref 0.7–1.2)
EOSINOPHIL # BLD: 0.11 K/UL (ref 0.05–0.5)
EOSINOPHILS RELATIVE PERCENT: 1 % (ref 0–6)
ERYTHROCYTE [DISTWIDTH] IN BLOOD BY AUTOMATED COUNT: 13.2 % (ref 11.5–15)
GFR SERPL CREATININE-BSD FRML MDRD: >60 ML/MIN/1.73M2
GLUCOSE SERPL-MCNC: 142 MG/DL (ref 74–99)
HCT VFR BLD AUTO: 31.6 % (ref 37–54)
HGB BLD-MCNC: 10.6 G/DL (ref 12.5–16.5)
IMM GRANULOCYTES # BLD AUTO: 0.05 K/UL (ref 0–0.58)
IMM GRANULOCYTES NFR BLD: 0 % (ref 0–5)
LYMPHOCYTES NFR BLD: 0.84 K/UL (ref 1.5–4)
LYMPHOCYTES RELATIVE PERCENT: 7 % (ref 20–42)
MAGNESIUM SERPL-MCNC: 2.4 MG/DL (ref 1.6–2.6)
MCH RBC QN AUTO: 29.9 PG (ref 26–35)
MCHC RBC AUTO-ENTMCNC: 33.5 G/DL (ref 32–34.5)
MCV RBC AUTO: 89 FL (ref 80–99.9)
MONOCYTES NFR BLD: 7 % (ref 2–12)
NEUTROPHILS NFR BLD: 84 % (ref 43–80)
NEUTS SEG NFR BLD: 9.66 K/UL (ref 1.8–7.3)
PLATELET # BLD AUTO: 208 K/UL (ref 130–450)
PMV BLD AUTO: 11.2 FL (ref 7–12)
POTASSIUM SERPL-SCNC: 3.5 MMOL/L (ref 3.5–5)
RBC # BLD AUTO: 3.55 M/UL (ref 3.8–5.8)
SODIUM SERPL-SCNC: 137 MMOL/L (ref 132–146)
WBC OTHER # BLD: 11.5 K/UL (ref 4.5–11.5)

## 2024-02-11 PROCEDURE — 7100000000 HC PACU RECOVERY - FIRST 15 MIN: Performed by: STUDENT IN AN ORGANIZED HEALTH CARE EDUCATION/TRAINING PROGRAM

## 2024-02-11 PROCEDURE — 80048 BASIC METABOLIC PNL TOTAL CA: CPT

## 2024-02-11 PROCEDURE — 36415 COLL VENOUS BLD VENIPUNCTURE: CPT

## 2024-02-11 PROCEDURE — 6360000002 HC RX W HCPCS

## 2024-02-11 PROCEDURE — 6370000000 HC RX 637 (ALT 250 FOR IP)

## 2024-02-11 PROCEDURE — 0PSM0ZZ REPOSITION RIGHT CARPAL, OPEN APPROACH: ICD-10-PCS | Performed by: STUDENT IN AN ORGANIZED HEALTH CARE EDUCATION/TRAINING PROGRAM

## 2024-02-11 PROCEDURE — C1776 JOINT DEVICE (IMPLANTABLE): HCPCS | Performed by: STUDENT IN AN ORGANIZED HEALTH CARE EDUCATION/TRAINING PROGRAM

## 2024-02-11 PROCEDURE — 7100000001 HC PACU RECOVERY - ADDTL 15 MIN: Performed by: STUDENT IN AN ORGANIZED HEALTH CARE EDUCATION/TRAINING PROGRAM

## 2024-02-11 PROCEDURE — 2580000003 HC RX 258: Performed by: NEUROLOGICAL SURGERY

## 2024-02-11 PROCEDURE — C1713 ANCHOR/SCREW BN/BN,TIS/BN: HCPCS | Performed by: STUDENT IN AN ORGANIZED HEALTH CARE EDUCATION/TRAINING PROGRAM

## 2024-02-11 PROCEDURE — 3600000016 HC SURGERY LEVEL 6 ADDTL 15MIN: Performed by: STUDENT IN AN ORGANIZED HEALTH CARE EDUCATION/TRAINING PROGRAM

## 2024-02-11 PROCEDURE — 73110 X-RAY EXAM OF WRIST: CPT

## 2024-02-11 PROCEDURE — 2500000003 HC RX 250 WO HCPCS: Performed by: STUDENT IN AN ORGANIZED HEALTH CARE EDUCATION/TRAINING PROGRAM

## 2024-02-11 PROCEDURE — 3600000006 HC SURGERY LEVEL 6 BASE: Performed by: STUDENT IN AN ORGANIZED HEALTH CARE EDUCATION/TRAINING PROGRAM

## 2024-02-11 PROCEDURE — 99232 SBSQ HOSP IP/OBS MODERATE 35: CPT | Performed by: SURGERY

## 2024-02-11 PROCEDURE — 2500000003 HC RX 250 WO HCPCS

## 2024-02-11 PROCEDURE — 3700000001 HC ADD 15 MINUTES (ANESTHESIA): Performed by: STUDENT IN AN ORGANIZED HEALTH CARE EDUCATION/TRAINING PROGRAM

## 2024-02-11 PROCEDURE — 85025 COMPLETE CBC W/AUTO DIFF WBC: CPT

## 2024-02-11 PROCEDURE — 2580000003 HC RX 258

## 2024-02-11 PROCEDURE — 2060000000 HC ICU INTERMEDIATE R&B

## 2024-02-11 PROCEDURE — 6370000000 HC RX 637 (ALT 250 FOR IP): Performed by: NEUROLOGICAL SURGERY

## 2024-02-11 PROCEDURE — 6360000002 HC RX W HCPCS: Performed by: STUDENT IN AN ORGANIZED HEALTH CARE EDUCATION/TRAINING PROGRAM

## 2024-02-11 PROCEDURE — 83735 ASSAY OF MAGNESIUM: CPT

## 2024-02-11 PROCEDURE — 0MQ50ZZ REPAIR RIGHT WRIST BURSA AND LIGAMENT, OPEN APPROACH: ICD-10-PCS | Performed by: STUDENT IN AN ORGANIZED HEALTH CARE EDUCATION/TRAINING PROGRAM

## 2024-02-11 PROCEDURE — 2709999900 HC NON-CHARGEABLE SUPPLY: Performed by: STUDENT IN AN ORGANIZED HEALTH CARE EDUCATION/TRAINING PROGRAM

## 2024-02-11 PROCEDURE — 3700000000 HC ANESTHESIA ATTENDED CARE: Performed by: STUDENT IN AN ORGANIZED HEALTH CARE EDUCATION/TRAINING PROGRAM

## 2024-02-11 DEVICE — H/W INTERNALBRACE LGMNT AUGMNT REPR KIT
Type: IMPLANTABLE DEVICE | Site: WRIST | Status: FUNCTIONAL
Brand: ARTHREX®

## 2024-02-11 DEVICE — K WIRE FIX L6IN DIA0.045IN 1600645] MICROAIRE SURGICAL INSTRUMENTS INC]: Type: IMPLANTABLE DEVICE | Site: WRIST | Status: FUNCTIONAL

## 2024-02-11 DEVICE — DX SWIVELOCK SL, 3.5X8.5MM W/FORK EYELET
Type: IMPLANTABLE DEVICE | Site: WRIST | Status: FUNCTIONAL
Brand: ARTHREX®

## 2024-02-11 RX ORDER — HYDROMORPHONE HYDROCHLORIDE 1 MG/ML
0.25 INJECTION, SOLUTION INTRAMUSCULAR; INTRAVENOUS; SUBCUTANEOUS EVERY 5 MIN PRN
Status: DISCONTINUED | OUTPATIENT
Start: 2024-02-11 | End: 2024-02-11 | Stop reason: HOSPADM

## 2024-02-11 RX ORDER — SODIUM CHLORIDE 0.9 % (FLUSH) 0.9 %
5-40 SYRINGE (ML) INJECTION PRN
Status: DISCONTINUED | OUTPATIENT
Start: 2024-02-11 | End: 2024-02-11 | Stop reason: HOSPADM

## 2024-02-11 RX ORDER — ONDANSETRON 2 MG/ML
4 INJECTION INTRAMUSCULAR; INTRAVENOUS
Status: DISCONTINUED | OUTPATIENT
Start: 2024-02-11 | End: 2024-02-11 | Stop reason: HOSPADM

## 2024-02-11 RX ORDER — MEPERIDINE HYDROCHLORIDE 25 MG/ML
12.5 INJECTION INTRAMUSCULAR; INTRAVENOUS; SUBCUTANEOUS EVERY 5 MIN PRN
Status: DISCONTINUED | OUTPATIENT
Start: 2024-02-11 | End: 2024-02-11 | Stop reason: HOSPADM

## 2024-02-11 RX ORDER — HYDRALAZINE HYDROCHLORIDE 20 MG/ML
10 INJECTION INTRAMUSCULAR; INTRAVENOUS
Status: DISCONTINUED | OUTPATIENT
Start: 2024-02-11 | End: 2024-02-11 | Stop reason: HOSPADM

## 2024-02-11 RX ORDER — LIDOCAINE HYDROCHLORIDE 20 MG/ML
INJECTION, SOLUTION INTRAVENOUS PRN
Status: DISCONTINUED | OUTPATIENT
Start: 2024-02-11 | End: 2024-02-11 | Stop reason: SDUPTHER

## 2024-02-11 RX ORDER — SUCCINYLCHOLINE/SOD CL,ISO/PF 200MG/10ML
SYRINGE (ML) INTRAVENOUS PRN
Status: DISCONTINUED | OUTPATIENT
Start: 2024-02-11 | End: 2024-02-11 | Stop reason: SDUPTHER

## 2024-02-11 RX ORDER — LABETALOL HYDROCHLORIDE 5 MG/ML
10 INJECTION, SOLUTION INTRAVENOUS
Status: DISCONTINUED | OUTPATIENT
Start: 2024-02-11 | End: 2024-02-11 | Stop reason: HOSPADM

## 2024-02-11 RX ORDER — SODIUM CHLORIDE, SODIUM LACTATE, POTASSIUM CHLORIDE, CALCIUM CHLORIDE 600; 310; 30; 20 MG/100ML; MG/100ML; MG/100ML; MG/100ML
INJECTION, SOLUTION INTRAVENOUS CONTINUOUS PRN
Status: DISCONTINUED | OUTPATIENT
Start: 2024-02-11 | End: 2024-02-11 | Stop reason: SDUPTHER

## 2024-02-11 RX ORDER — SODIUM CHLORIDE 0.9 % (FLUSH) 0.9 %
5-40 SYRINGE (ML) INJECTION EVERY 12 HOURS SCHEDULED
Status: DISCONTINUED | OUTPATIENT
Start: 2024-02-11 | End: 2024-02-11 | Stop reason: HOSPADM

## 2024-02-11 RX ORDER — IPRATROPIUM BROMIDE AND ALBUTEROL SULFATE 2.5; .5 MG/3ML; MG/3ML
1 SOLUTION RESPIRATORY (INHALATION)
Status: DISCONTINUED | OUTPATIENT
Start: 2024-02-11 | End: 2024-02-11 | Stop reason: HOSPADM

## 2024-02-11 RX ORDER — CEFAZOLIN SODIUM 1 G/3ML
INJECTION, POWDER, FOR SOLUTION INTRAMUSCULAR; INTRAVENOUS PRN
Status: DISCONTINUED | OUTPATIENT
Start: 2024-02-11 | End: 2024-02-11 | Stop reason: SDUPTHER

## 2024-02-11 RX ORDER — SODIUM CHLORIDE 9 MG/ML
INJECTION, SOLUTION INTRAVENOUS PRN
Status: DISCONTINUED | OUTPATIENT
Start: 2024-02-11 | End: 2024-02-11 | Stop reason: HOSPADM

## 2024-02-11 RX ORDER — PROPOFOL 10 MG/ML
INJECTION, EMULSION INTRAVENOUS PRN
Status: DISCONTINUED | OUTPATIENT
Start: 2024-02-11 | End: 2024-02-11 | Stop reason: SDUPTHER

## 2024-02-11 RX ORDER — MIDAZOLAM HYDROCHLORIDE 2 MG/2ML
2 INJECTION, SOLUTION INTRAMUSCULAR; INTRAVENOUS
Status: DISCONTINUED | OUTPATIENT
Start: 2024-02-11 | End: 2024-02-11 | Stop reason: HOSPADM

## 2024-02-11 RX ORDER — MIDAZOLAM HYDROCHLORIDE 1 MG/ML
INJECTION INTRAMUSCULAR; INTRAVENOUS PRN
Status: DISCONTINUED | OUTPATIENT
Start: 2024-02-11 | End: 2024-02-11 | Stop reason: SDUPTHER

## 2024-02-11 RX ORDER — ROCURONIUM BROMIDE 10 MG/ML
INJECTION, SOLUTION INTRAVENOUS PRN
Status: DISCONTINUED | OUTPATIENT
Start: 2024-02-11 | End: 2024-02-11 | Stop reason: SDUPTHER

## 2024-02-11 RX ORDER — ONDANSETRON 2 MG/ML
INJECTION INTRAMUSCULAR; INTRAVENOUS PRN
Status: DISCONTINUED | OUTPATIENT
Start: 2024-02-11 | End: 2024-02-11 | Stop reason: SDUPTHER

## 2024-02-11 RX ORDER — FENTANYL CITRATE 50 UG/ML
INJECTION, SOLUTION INTRAMUSCULAR; INTRAVENOUS PRN
Status: DISCONTINUED | OUTPATIENT
Start: 2024-02-11 | End: 2024-02-11 | Stop reason: SDUPTHER

## 2024-02-11 RX ORDER — BUPIVACAINE HYDROCHLORIDE 2.5 MG/ML
INJECTION, SOLUTION EPIDURAL; INFILTRATION; INTRACAUDAL PRN
Status: DISCONTINUED | OUTPATIENT
Start: 2024-02-11 | End: 2024-02-11 | Stop reason: HOSPADM

## 2024-02-11 RX ORDER — DEXAMETHASONE SODIUM PHOSPHATE 10 MG/ML
INJECTION INTRAMUSCULAR; INTRAVENOUS PRN
Status: DISCONTINUED | OUTPATIENT
Start: 2024-02-11 | End: 2024-02-11 | Stop reason: SDUPTHER

## 2024-02-11 RX ORDER — SODIUM CHLORIDE 9 MG/ML
INJECTION, SOLUTION INTRAVENOUS CONTINUOUS PRN
Status: DISCONTINUED | OUTPATIENT
Start: 2024-02-11 | End: 2024-02-11 | Stop reason: SDUPTHER

## 2024-02-11 RX ORDER — PHENYLEPHRINE HCL IN 0.9% NACL 1 MG/10 ML
SYRINGE (ML) INTRAVENOUS PRN
Status: DISCONTINUED | OUTPATIENT
Start: 2024-02-11 | End: 2024-02-11 | Stop reason: SDUPTHER

## 2024-02-11 RX ORDER — HYDROMORPHONE HYDROCHLORIDE 1 MG/ML
0.5 INJECTION, SOLUTION INTRAMUSCULAR; INTRAVENOUS; SUBCUTANEOUS EVERY 5 MIN PRN
Status: DISCONTINUED | OUTPATIENT
Start: 2024-02-11 | End: 2024-02-11 | Stop reason: HOSPADM

## 2024-02-11 RX ORDER — LIDOCAINE HYDROCHLORIDE AND EPINEPHRINE 10; 10 MG/ML; UG/ML
INJECTION, SOLUTION INFILTRATION; PERINEURAL PRN
Status: DISCONTINUED | OUTPATIENT
Start: 2024-02-11 | End: 2024-02-11 | Stop reason: HOSPADM

## 2024-02-11 RX ADMIN — METHOCARBAMOL 1000 MG: 500 TABLET ORAL at 10:36

## 2024-02-11 RX ADMIN — PROPOFOL 150 MG: 10 INJECTION, EMULSION INTRAVENOUS at 07:38

## 2024-02-11 RX ADMIN — Medication 120 MG: at 07:38

## 2024-02-11 RX ADMIN — Medication 100 MCG: at 08:16

## 2024-02-11 RX ADMIN — SENNOSIDES AND DOCUSATE SODIUM 1 TABLET: 50; 8.6 TABLET ORAL at 10:35

## 2024-02-11 RX ADMIN — METHOCARBAMOL 1000 MG: 500 TABLET ORAL at 13:03

## 2024-02-11 RX ADMIN — FENTANYL CITRATE 100 MCG: 0.05 INJECTION, SOLUTION INTRAMUSCULAR; INTRAVENOUS at 07:38

## 2024-02-11 RX ADMIN — OXYCODONE HYDROCHLORIDE 10 MG: 10 TABLET ORAL at 01:29

## 2024-02-11 RX ADMIN — ATORVASTATIN CALCIUM 40 MG: 40 TABLET, FILM COATED ORAL at 19:53

## 2024-02-11 RX ADMIN — ACETAMINOPHEN 650 MG: 325 TABLET ORAL at 04:23

## 2024-02-11 RX ADMIN — FENTANYL CITRATE 50 MCG: 0.05 INJECTION, SOLUTION INTRAMUSCULAR; INTRAVENOUS at 08:08

## 2024-02-11 RX ADMIN — OXYCODONE HYDROCHLORIDE 10 MG: 10 TABLET ORAL at 18:08

## 2024-02-11 RX ADMIN — Medication 100 MCG: at 08:21

## 2024-02-11 RX ADMIN — SODIUM CHLORIDE, PRESERVATIVE FREE 10 ML: 5 INJECTION INTRAVENOUS at 19:53

## 2024-02-11 RX ADMIN — METHOCARBAMOL 1000 MG: 500 TABLET ORAL at 19:53

## 2024-02-11 RX ADMIN — SUGAMMADEX 200 MG: 100 INJECTION, SOLUTION INTRAVENOUS at 09:01

## 2024-02-11 RX ADMIN — CEFAZOLIN 2 G: 1 INJECTION, POWDER, FOR SOLUTION INTRAMUSCULAR; INTRAVENOUS at 07:45

## 2024-02-11 RX ADMIN — BISACODYL 10 MG: 10 SUPPOSITORY RECTAL at 09:00

## 2024-02-11 RX ADMIN — Medication 100 MCG: at 08:33

## 2024-02-11 RX ADMIN — FENTANYL CITRATE 50 MCG: 0.05 INJECTION, SOLUTION INTRAMUSCULAR; INTRAVENOUS at 09:03

## 2024-02-11 RX ADMIN — ACETAMINOPHEN 650 MG: 325 TABLET ORAL at 17:57

## 2024-02-11 RX ADMIN — POLYETHYLENE GLYCOL 3350 17 GRAM ORAL POWDER PACKET 17 G: at 09:00

## 2024-02-11 RX ADMIN — DEXAMETHASONE SODIUM PHOSPHATE 10 MG: 10 INJECTION INTRAMUSCULAR; INTRAVENOUS at 07:33

## 2024-02-11 RX ADMIN — HYDROMORPHONE HYDROCHLORIDE 0.5 MG: 1 INJECTION, SOLUTION INTRAMUSCULAR; INTRAVENOUS; SUBCUTANEOUS at 15:23

## 2024-02-11 RX ADMIN — SENNOSIDES AND DOCUSATE SODIUM 1 TABLET: 50; 8.6 TABLET ORAL at 19:53

## 2024-02-11 RX ADMIN — ONDANSETRON 4 MG: 2 INJECTION INTRAMUSCULAR; INTRAVENOUS at 07:33

## 2024-02-11 RX ADMIN — SODIUM CHLORIDE, POTASSIUM CHLORIDE, SODIUM LACTATE AND CALCIUM CHLORIDE: 600; 310; 30; 20 INJECTION, SOLUTION INTRAVENOUS at 09:00

## 2024-02-11 RX ADMIN — SODIUM CHLORIDE, PRESERVATIVE FREE 8 ML: 5 INJECTION INTRAVENOUS at 09:00

## 2024-02-11 RX ADMIN — OXYCODONE HYDROCHLORIDE 10 MG: 10 TABLET ORAL at 14:01

## 2024-02-11 RX ADMIN — TAMSULOSIN HYDROCHLORIDE 0.4 MG: 0.4 CAPSULE ORAL at 19:52

## 2024-02-11 RX ADMIN — ROCURONIUM BROMIDE 30 MG: 10 INJECTION, SOLUTION INTRAVENOUS at 07:50

## 2024-02-11 RX ADMIN — ACETAMINOPHEN 650 MG: 325 TABLET ORAL at 23:23

## 2024-02-11 RX ADMIN — Medication 50 MCG: at 07:51

## 2024-02-11 RX ADMIN — OXYCODONE HYDROCHLORIDE 10 MG: 10 TABLET ORAL at 05:29

## 2024-02-11 RX ADMIN — OXYCODONE HYDROCHLORIDE 10 MG: 10 TABLET ORAL at 23:23

## 2024-02-11 RX ADMIN — SODIUM CHLORIDE, PRESERVATIVE FREE 10 ML: 5 INJECTION INTRAVENOUS at 09:00

## 2024-02-11 RX ADMIN — HYDROMORPHONE HYDROCHLORIDE 0.5 MG: 1 INJECTION, SOLUTION INTRAMUSCULAR; INTRAVENOUS; SUBCUTANEOUS at 19:52

## 2024-02-11 RX ADMIN — SODIUM CHLORIDE: 9 INJECTION, SOLUTION INTRAVENOUS at 07:32

## 2024-02-11 RX ADMIN — MIDAZOLAM 2 MG: 1 INJECTION INTRAMUSCULAR; INTRAVENOUS at 07:33

## 2024-02-11 RX ADMIN — HYDROMORPHONE HYDROCHLORIDE 0.5 MG: 1 INJECTION, SOLUTION INTRAMUSCULAR; INTRAVENOUS; SUBCUTANEOUS at 03:08

## 2024-02-11 RX ADMIN — FINASTERIDE 5 MG: 5 TABLET, FILM COATED ORAL at 19:53

## 2024-02-11 RX ADMIN — HYDROMORPHONE HYDROCHLORIDE 0.5 MG: 1 INJECTION, SOLUTION INTRAMUSCULAR; INTRAVENOUS; SUBCUTANEOUS at 11:41

## 2024-02-11 RX ADMIN — OXYCODONE HYDROCHLORIDE 10 MG: 10 TABLET ORAL at 10:36

## 2024-02-11 RX ADMIN — Medication 50 MCG: at 07:45

## 2024-02-11 RX ADMIN — LIDOCAINE HYDROCHLORIDE 60 MG: 20 INJECTION, SOLUTION INTRAVENOUS at 07:38

## 2024-02-11 RX ADMIN — ACETAMINOPHEN 650 MG: 325 TABLET ORAL at 10:35

## 2024-02-11 RX ADMIN — HYDROMORPHONE HYDROCHLORIDE 0.5 MG: 1 INJECTION, SOLUTION INTRAMUSCULAR; INTRAVENOUS; SUBCUTANEOUS at 06:31

## 2024-02-11 RX ADMIN — HYDROCHLOROTHIAZIDE 12.5 MG: 25 TABLET ORAL at 10:35

## 2024-02-11 RX ADMIN — LISINOPRIL 20 MG: 20 TABLET ORAL at 10:36

## 2024-02-11 RX ADMIN — METHOCARBAMOL 1000 MG: 500 TABLET ORAL at 17:57

## 2024-02-11 NOTE — OP NOTE
[facility]                  [address] [place] [address]                                OPERATIVE REPORT    PATIENT NAME: MIGUEL CLEMONS                     :        1950  MED REC NO:   [id number]                            ROOM:       [facility]  ACCOUNT NO:   [id number]                           ADMIT DATE: 2024  PROVIDER:     Byron Hoover MD    DATE OF PROCEDURE:  2024    PREOPERATIVE DIAGNOSIS:  L1 burst fracture.    POSTOPERATIVE DIAGNOSIS:  L1 burst fracture.    OPERATIVE PROCEDURES:  1.  Bilateral segmental arthrodesis and fusion from T11-L3 with use of  bilateral T11, T12, L2, and L3 pedicle screws with use of locally  harvested autograft plus allograft in the form of cancellous chips and  Ossifuse for posterolateral fusion from T11-L3.  2.  Bilateral L1 laminectomy.  3.  Use of O-arm assisted navigation with placement of pedicle screws.  4.  Use of free-running EMG to test pedicle screw heads.  5.  AS modifier for Justin Welch PA-C, who assisted with primary  exposure and primary closure.    ANESTHESIA:  General endotracheal anesthesia.    SURGEON:  Byron Hoover MD    ASSISTANT:  Justin Welch PA-C    COMPLICATIONS:  None.    ESTIMATED BLOOD LOSS:  400 mL.    SPECIMEN:  None.    OPERATIVE INDICATIONS:  The patient is a 73-year-old gentleman who fell  off a roof yesterday.  He sustained a burst fracture.  He was brought to  [facility]; and after risks, benefits, and alternatives were  discussed with the patient, it was determined that he would undergo the  above-listed procedure.  Of note, Justin Welch PA-C's services were  required as he was the only qualified assistant to assist with primary  exposure and primary closure.    OPERATIVE PROCEDURE IN DETAIL: The patient was brought into the  operating room.  A time-out was performed where he was identified by his  name, medical record number, and the operative 
Container Standard 02/11/24 0549   Securement Method Leg strap 02/10/24 0800   Catheter Care  Soap and water 02/09/24 1146   Catheter Best Practices  Drainage tube clipped to bed;Tamper seal intact;Bag below bladder;Bag not on floor;Lack of dependent loop in tubing;Drainage bag less than half full 02/10/24 0800   Status Draining 02/10/24 0800   Output (mL) 1100 mL 02/11/24 0549       [REMOVED] Closed/Suction Drain Posterior Back Accordion (Removed)   Site Description Clean, dry & intact 02/10/24 0800   Dressing Status Clean, dry & intact 02/10/24 0800   Drainage Appearance Bloody 02/10/24 0800   Drain Status Compressed 02/10/24 0800   Output (ml) 65 ml 02/09/24 8105       Findings: Right scapholunate ligament rupture and lunotriquetral ligament  rupture        Detailed Description of Procedure:   The patient was identified and brought into the operating room. A tourniquet was placed around her proximal right arm. It was prepped and draped in a sterile fashion. Surgical timeout was performed. The arm was exsanguinated. The tourniquet was inflated to 250 mmHg.     A dorsal longitudinal incision was made over the fourth dorsal compartment.  Skin flaps was then carefully elevated radially and ulnarly.  The EPL tendon was identified.  EPL tendon was were then released from the third compartment and transposed subcutaneously.  The retinaculum over the second fourth and fifth compartments were then released and elevated radially and ulnarly exposing the dorsal capsule.  The dorsal capsule was noted to be ruptured at the scaphoid fossa region.  This previous rupture was then extended ulnarly to the triquetrum.  And then extended radially across the dorsal carpal ligament and a ligament sparing approach.  The carpal bones were identified.  There was noted complete instability between the scaphoid, lunate and triquetrum.  It was noted that the scapholunate ligament was then ruptured off the lunate.  And the lunate triquetral

## 2024-02-12 VITALS
HEIGHT: 70 IN | HEART RATE: 91 BPM | SYSTOLIC BLOOD PRESSURE: 115 MMHG | OXYGEN SATURATION: 94 % | BODY MASS INDEX: 25.05 KG/M2 | TEMPERATURE: 98.9 F | DIASTOLIC BLOOD PRESSURE: 77 MMHG | WEIGHT: 175 LBS | RESPIRATION RATE: 18 BRPM

## 2024-02-12 LAB
ANION GAP SERPL CALCULATED.3IONS-SCNC: 11 MMOL/L (ref 7–16)
BASOPHILS # BLD: 0.01 K/UL (ref 0–0.2)
BASOPHILS NFR BLD: 0 % (ref 0–2)
BUN SERPL-MCNC: 30 MG/DL (ref 6–23)
CALCIUM SERPL-MCNC: 8.1 MG/DL (ref 8.6–10.2)
CHLORIDE SERPL-SCNC: 101 MMOL/L (ref 98–107)
CO2 SERPL-SCNC: 27 MMOL/L (ref 22–29)
CREAT SERPL-MCNC: 0.8 MG/DL (ref 0.7–1.2)
EOSINOPHIL # BLD: 0.03 K/UL (ref 0.05–0.5)
EOSINOPHILS RELATIVE PERCENT: 0 % (ref 0–6)
ERYTHROCYTE [DISTWIDTH] IN BLOOD BY AUTOMATED COUNT: 13.4 % (ref 11.5–15)
GFR SERPL CREATININE-BSD FRML MDRD: >60 ML/MIN/1.73M2
GLUCOSE SERPL-MCNC: 128 MG/DL (ref 74–99)
HCT VFR BLD AUTO: 27.4 % (ref 37–54)
HGB BLD-MCNC: 9.2 G/DL (ref 12.5–16.5)
IMM GRANULOCYTES # BLD AUTO: 0.05 K/UL (ref 0–0.58)
IMM GRANULOCYTES NFR BLD: 1 % (ref 0–5)
LYMPHOCYTES NFR BLD: 0.75 K/UL (ref 1.5–4)
LYMPHOCYTES RELATIVE PERCENT: 8 % (ref 20–42)
MAGNESIUM SERPL-MCNC: 2.3 MG/DL (ref 1.6–2.6)
MCH RBC QN AUTO: 29.9 PG (ref 26–35)
MCHC RBC AUTO-ENTMCNC: 33.6 G/DL (ref 32–34.5)
MCV RBC AUTO: 89 FL (ref 80–99.9)
MONOCYTES NFR BLD: 0.78 K/UL (ref 0.1–0.95)
MONOCYTES NFR BLD: 8 % (ref 2–12)
NEUTROPHILS NFR BLD: 83 % (ref 43–80)
NEUTS SEG NFR BLD: 8.02 K/UL (ref 1.8–7.3)
PLATELET # BLD AUTO: 215 K/UL (ref 130–450)
PMV BLD AUTO: 10.7 FL (ref 7–12)
POTASSIUM SERPL-SCNC: 3.5 MMOL/L (ref 3.5–5)
RBC # BLD AUTO: 3.08 M/UL (ref 3.8–5.8)
SODIUM SERPL-SCNC: 139 MMOL/L (ref 132–146)
WBC OTHER # BLD: 9.6 K/UL (ref 4.5–11.5)

## 2024-02-12 PROCEDURE — 6360000002 HC RX W HCPCS

## 2024-02-12 PROCEDURE — 2580000003 HC RX 258: Performed by: NEUROLOGICAL SURGERY

## 2024-02-12 PROCEDURE — 6370000000 HC RX 637 (ALT 250 FOR IP): Performed by: NEUROLOGICAL SURGERY

## 2024-02-12 PROCEDURE — 97530 THERAPEUTIC ACTIVITIES: CPT

## 2024-02-12 PROCEDURE — 80048 BASIC METABOLIC PNL TOTAL CA: CPT

## 2024-02-12 PROCEDURE — 36415 COLL VENOUS BLD VENIPUNCTURE: CPT

## 2024-02-12 PROCEDURE — 85025 COMPLETE CBC W/AUTO DIFF WBC: CPT

## 2024-02-12 PROCEDURE — 97535 SELF CARE MNGMENT TRAINING: CPT

## 2024-02-12 PROCEDURE — 99238 HOSP IP/OBS DSCHRG MGMT 30/<: CPT | Performed by: SURGERY

## 2024-02-12 PROCEDURE — 6370000000 HC RX 637 (ALT 250 FOR IP)

## 2024-02-12 PROCEDURE — L0464 TLSO 4MOD SACRO-SCAP PRE: HCPCS

## 2024-02-12 PROCEDURE — 83735 ASSAY OF MAGNESIUM: CPT

## 2024-02-12 RX ORDER — POLYETHYLENE GLYCOL 3350 17 G/17G
17 POWDER, FOR SOLUTION ORAL DAILY
Qty: 30 PACKET | Refills: 0 | Status: SHIPPED | OUTPATIENT
Start: 2024-02-13 | End: 2024-03-14

## 2024-02-12 RX ORDER — OXYCODONE HYDROCHLORIDE 5 MG/1
5 TABLET ORAL EVERY 6 HOURS PRN
Qty: 28 TABLET | Refills: 0 | Status: SHIPPED | OUTPATIENT
Start: 2024-02-12 | End: 2024-02-19

## 2024-02-12 RX ORDER — POTASSIUM CHLORIDE 20 MEQ/1
40 TABLET, EXTENDED RELEASE ORAL ONCE
Status: COMPLETED | OUTPATIENT
Start: 2024-02-12 | End: 2024-02-12

## 2024-02-12 RX ORDER — METHOCARBAMOL 1000 MG/1
1000 TABLET, COATED ORAL 4 TIMES DAILY
Qty: 40 TABLET | Refills: 0 | Status: SHIPPED | OUTPATIENT
Start: 2024-02-12 | End: 2024-02-22

## 2024-02-12 RX ADMIN — HEPARIN SODIUM 5000 UNITS: 10000 INJECTION INTRAVENOUS; SUBCUTANEOUS at 09:03

## 2024-02-12 RX ADMIN — LISINOPRIL 20 MG: 20 TABLET ORAL at 09:02

## 2024-02-12 RX ADMIN — ACETAMINOPHEN 650 MG: 325 TABLET ORAL at 04:48

## 2024-02-12 RX ADMIN — HYDROCHLOROTHIAZIDE 12.5 MG: 25 TABLET ORAL at 09:02

## 2024-02-12 RX ADMIN — METHOCARBAMOL 1000 MG: 500 TABLET ORAL at 09:02

## 2024-02-12 RX ADMIN — OXYCODONE HYDROCHLORIDE 10 MG: 10 TABLET ORAL at 03:04

## 2024-02-12 RX ADMIN — OXYCODONE HYDROCHLORIDE 10 MG: 10 TABLET ORAL at 09:02

## 2024-02-12 RX ADMIN — METHOCARBAMOL 1000 MG: 500 TABLET ORAL at 13:12

## 2024-02-12 RX ADMIN — SODIUM CHLORIDE, PRESERVATIVE FREE 10 ML: 5 INJECTION INTRAVENOUS at 09:03

## 2024-02-12 RX ADMIN — ACETAMINOPHEN 650 MG: 325 TABLET ORAL at 09:06

## 2024-02-12 RX ADMIN — HYDROMORPHONE HYDROCHLORIDE 0.5 MG: 1 INJECTION, SOLUTION INTRAMUSCULAR; INTRAVENOUS; SUBCUTANEOUS at 04:48

## 2024-02-12 RX ADMIN — POTASSIUM CHLORIDE 40 MEQ: 1500 TABLET, EXTENDED RELEASE ORAL at 13:12

## 2024-02-12 NOTE — CARE COORDINATION
Spoke with patient and spouse. Both feel he is moving significantly better today. Ambulated the unit with no assistive devices. Discussed homecare and patient and spouse declined. They are hopeful for patient to return home today with no needs. Updated trauma team.     For questions I can be reached at [phone]. JOEL Beltran

## 2024-02-12 NOTE — PROGRESS NOTES
CLINICAL PHARMACY NOTE: MEDS TO BEDS    Total # of Prescriptions Filled: 3   The following medications were delivered to the patient:  Methocarbamol 500  Oxycodone 5  Polyethylene glycol     Additional Documentation:  Pt wife Beth picked up in pharmacy   
0915: Malik catheter removed. Pt tolerated procedure well. Due to void by 1515.  1420: Pt voided 200 mL.    Electronically signed by Zo Grossman RN on 2/12/2024 at 9:24 AM    
0921  transferred to PACU  post ORIF of right wrist repair.  Right hand elevated on pillow.  Fingrs warmwith movement and brisk capillary refill.  Ace wrap dry and intact.  0945 xrays completed.   
4 Eyes Skin Assessment     NAME:  Kiel Choudhary  YOB: 1950  MEDICAL RECORD NUMBER:  [id number]    The patient is being assessed for  Admission    I agree that at least one RN has performed a thorough Head to Toe Skin Assessment on the patient. ALL assessment sites listed below have been assessed.      Areas assessed by both nurses:    Head, Face, Ears, Shoulders, Back, Chest, Arms, Elbows, Hands, Sacrum. Buttock, Coccyx, Ischium, Legs. Feet and Heels, and Under Medical Devices         Does the Patient have a Wound? No noted wound(s)       Abel Prevention initiated by RN: No  Wound Care Orders initiated by RN: No    Pressure Injury (Stage 3,4, Unstageable, DTI, NWPT, and Complex wounds) if present, place Wound referral order by RN under : No    New Ostomies, if present place, Ostomy referral order under : No     Nurse 1 eSignature: Electronically signed by Zo Grossman RN on 2/7/24 at 3:42 PM EST    **SHARE this note so that the co-signing nurse can place an eSignature**    Nurse 2 eSignature: Electronically signed by Samira Kohler RN on 2/7/24 at 6:11 PM EST   
[facility].    Daily Progress Note 2/12/2024    Date of admission:  2/6/2024    CC: Mechanical fall    Subjective:  Just pain in his right hand this am.  Otherwise pain well-controlled.  Neurovascular exam unchanged.  Eating well and having bowel movements.    Objective:  /78   Pulse 91   Temp 98.2 °F (36.8 °C) (Temporal)   Resp 16   Ht 1.778 m (5' 10\")   Wt 79.4 kg (175 lb)   SpO2 99%   BMI 25.11 kg/m²     GENERAL:  Laying in bed, awake, alert, cooperative, no apparent distress.   NEUROLOGIC:  GCS 15, moves all 4 extremities with 5 out of 5 strength  HEAD: Normocephalic, atraumatic  EYES: No sclera icterus, pupils equal  LUNGS:  No increased work of breathing.  room air.  CARDIOVASCULAR:  RR  ABDOMEN:  Soft, non-tender, non-distended  EXTREMITIES: No edema or swelling. RUE in splint and wrapped in ace wrap.   SKIN: Warm and dry    Assessment/Plan:  73 y.o. male s/p mechanical fall with unstable lumbar fx s/p fusion 2/7 and R styloid fx with lunate dislocation s/p OR for carpal ligament repair 2/11    Principal Problem:    Trauma  Active Problems:    Closed burst fracture of lumbar vertebra (HCC)    Head injury    Right foot injury, initial encounter    Closed fracture dislocation of lunate bone of right wrist    Closed fracture of right wrist  Resolved Problems:    * No resolved hospital problems. *      Neuro: Acute pain syndrome: Tylenol, oxy, Robaxin  L1 vertebral body fracture involving 3 columns with 5 mm retropulsion and height loss: Maintain neutral spine.  Spine precautions.  Fusion 2/7. Lumbar drain out.  TLSO in place.  CV: HR near normal limits, no acute issues.  Home Zocor, lisinopril, hydrochlorothiazide  Pulm: tolerating room air    GI: tolerating general diet   Renal: no acute issues.  Will remove Malik.  ID: afebrile, no acute issues     Endocrine: no acute issues,   MSK: Right styloid fracture and lunate dislocation status post reduction: Orthopedic surgery for SL LT 
Department of Neurosurgery  Progress Note    CHIEF COMPLAINT: s/p lumbar fusion    SUBJECTIVE:  C/o incisional pain.     REVIEW OF SYSTEMS :  Constitutional: Negative for chills and fever.    Neurological: Negative for dizziness, tremors and speech change.     OBJECTIVE:   VITALS:  /77   Pulse 91   Temp 98.9 °F (37.2 °C) (Temporal)   Resp 18   Ht 1.778 m (5' 10\")   Wt 79.4 kg (175 lb)   SpO2 94%   BMI 25.11 kg/m²     PHYSICAL:  Alert, oriented  Appears stated age  PERRL  EOMI  Strength full  Sensation intact to light touch  Dressing c/d/i    DATA:  CBC:   Lab Results   Component Value Date/Time    WBC 9.6 02/12/2024 04:29 AM    RBC 3.08 02/12/2024 04:29 AM    HGB 9.2 02/12/2024 04:29 AM    HCT 27.4 02/12/2024 04:29 AM    MCV 89.0 02/12/2024 04:29 AM    MCH 29.9 02/12/2024 04:29 AM    MCHC 33.6 02/12/2024 04:29 AM    RDW 13.4 02/12/2024 04:29 AM     02/12/2024 04:29 AM    MPV 10.7 02/12/2024 04:29 AM     BMP:    Lab Results   Component Value Date/Time     02/12/2024 04:29 AM    K 3.5 02/12/2024 04:29 AM     02/12/2024 04:29 AM    CO2 27 02/12/2024 04:29 AM    BUN 30 02/12/2024 04:29 AM    LABALBU 4.3 02/06/2024 07:45 PM    CREATININE 0.8 02/12/2024 04:29 AM    CALCIUM 8.1 02/12/2024 04:29 AM    LABGLOM >60 02/12/2024 04:29 AM    GLUCOSE 128 02/12/2024 04:29 AM     PT/INR:    Lab Results   Component Value Date/Time    PROTIME 13.0 02/06/2024 07:45 PM    INR 1.2 02/06/2024 07:45 PM     PTT:  No results found for: \"APTT\", \"PTT\"[APTT}    Current Inpatient Medications  Current Facility-Administered Medications: heparin (porcine) injection 5,000 Units, 5,000 Units, SubCUTAneous, Q8H  finasteride (PROSCAR) tablet 5 mg, 5 mg, Oral, Daily  hydroCHLOROthiazide (HYDRODIURIL) tablet 12.5 mg, 12.5 mg, Oral, Daily  lisinopril (PRINIVIL;ZESTRIL) tablet 20 mg, 20 mg, Oral, Daily  atorvastatin (LIPITOR) tablet 40 mg, 40 mg, Oral, Daily  tamsulosin (FLOMAX) capsule 0.4 mg, 0.4 mg, Oral, Daily  bisacodyl 
Department of Neurosurgery  Progress Note    CHIEF COMPLAINT: s/p lumbar fusion    SUBJECTIVE:  TLSO received. C/o incisional pain. Has been up with PT/OT    REVIEW OF SYSTEMS :  Constitutional: Negative for chills and fever.    Neurological: Negative for dizziness, tremors and speech change.     OBJECTIVE:   VITALS:  BP (!) 159/98   Pulse 81   Temp 97.6 °F (36.4 °C) (Temporal)   Resp 18   Ht 1.778 m (5' 10\")   Wt 79.4 kg (175 lb)   SpO2 97%   BMI 25.11 kg/m²     PHYSICAL:  Alert, oriented  Appears stated age  PERRL  EOMI  Strength full  Sensation intact to light touch  Dressing c/d/i    DATA:  CBC:   Lab Results   Component Value Date/Time    WBC 9.5 02/09/2024 06:48 AM    RBC 3.07 02/09/2024 06:48 AM    HGB 9.1 02/09/2024 06:48 AM    HCT 28.0 02/09/2024 06:48 AM    MCV 91.2 02/09/2024 06:48 AM    MCH 29.6 02/09/2024 06:48 AM    MCHC 32.5 02/09/2024 06:48 AM    RDW 13.4 02/09/2024 06:48 AM     02/09/2024 06:48 AM    MPV 11.2 02/09/2024 06:48 AM     BMP:    Lab Results   Component Value Date/Time     02/09/2024 06:48 AM    K 3.9 02/09/2024 06:48 AM     02/09/2024 06:48 AM    CO2 25 02/09/2024 06:48 AM    BUN 20 02/09/2024 06:48 AM    LABALBU 4.3 02/06/2024 07:45 PM    CREATININE 0.8 02/09/2024 06:48 AM    CALCIUM 7.9 02/09/2024 06:48 AM    LABGLOM >60 02/09/2024 06:48 AM    GLUCOSE 117 02/09/2024 06:48 AM     PT/INR:    Lab Results   Component Value Date/Time    PROTIME 13.0 02/06/2024 07:45 PM    INR 1.2 02/06/2024 07:45 PM     PTT:  No results found for: \"APTT\", \"PTT\"[APTT}    Current Inpatient Medications  Current Facility-Administered Medications: lactulose (CHRONULAC) 10 GM/15ML solution 20 g, 20 g, Oral, TID  finasteride (PROSCAR) tablet 5 mg, 5 mg, Oral, Daily  hydroCHLOROthiazide (HYDRODIURIL) tablet 12.5 mg, 12.5 mg, Oral, Daily  lisinopril (PRINIVIL;ZESTRIL) tablet 20 mg, 20 mg, Oral, Daily  atorvastatin (LIPITOR) tablet 40 mg, 40 mg, Oral, Daily  tadalafil (CIALIS) tablet 5 mg 
Department of Orthopedic Surgery  Resident Progress Note      SUBJECTIVE  Patient seen and examined. VAS 6/10. Denies acute fever, chills, nausea, vomiting , chest pain and shortness of breath. No new complaints.    OBJECTIVE    Physical    VITALS:  /78   Pulse 91   Temp 98.2 °F (36.8 °C) (Temporal)   Resp 16   Ht 1.778 m (5' 10\")   Wt 79.4 kg (175 lb)   SpO2 99%   BMI 25.11 kg/m²   MUSCULOSKELETAL:     right upper extremity:  Dressing C/D/I  Some post operative swelling at the digits.   Distal sensory intact: MRU  +AIN/PIN/M/R/U nerve function intact grossly  +2/4 Rad pulse  Compartments soft and compressible    Data    CBC:   Lab Results   Component Value Date/Time    WBC 9.6 02/12/2024 04:29 AM    RBC 3.08 02/12/2024 04:29 AM    HGB 9.2 02/12/2024 04:29 AM    HCT 27.4 02/12/2024 04:29 AM    MCV 89.0 02/12/2024 04:29 AM    MCH 29.9 02/12/2024 04:29 AM    MCHC 33.6 02/12/2024 04:29 AM    RDW 13.4 02/12/2024 04:29 AM     02/12/2024 04:29 AM    MPV 10.7 02/12/2024 04:29 AM     PT/INR:    Lab Results   Component Value Date/Time    PROTIME 13.0 02/06/2024 07:45 PM    INR 1.2 02/06/2024 07:45 PM       Labs  No results for input(s): \"BC\", \"BLOODCULT2\" in the last 72 hours.  No results for input(s): \"CXSURG\" in the last 72 hours.        ASSESSMENT  POD 1 s/p R wrist scapholunate and lunotriquetal ligament repair with internal brace and intercarpal pinning    PLAN    NWB RUE  24 hour abx coverage  Deep venous thrombosis prophylaxis - per admitting, early mobilization, and pneumatic compression device  PT/OT  Pain Control: IV and PO  Monitor H&H  D/C Plan:  Patient can be discharged when medically stable and have made the appropriate physical therapy gains. Patient will follow up with  Dr. Bashir Soto  in office in 2 weeks for repeat Xrs, splint removal and exos brace placement.    
Department of Orthopedic Surgery  Resident Progress Note    Patient seen and examined at bedside.  Patient awake alert oriented x 3 upon entering room.  Patient states they are doing well overall and that they are not having any pain to the right wrist or hand.  Patient states pain elsewhere on the body including there spine is once causing him most of the pain.  Patient denies any numbness or tingling traveling down the right upper extremity and right hand.  Patient has no other complaints today.    VITALS:  BP (!) 159/105   Pulse 86   Temp 99 °F (37.2 °C) (Oral)   Resp 16   Ht 1.778 m (5' 10\")   Wt 79.4 kg (175 lb)   SpO2 96%   BMI 25.11 kg/m²     General: Awake alert oriented x 3    MUSCULOSKELETAL:   right upper extremity:  Dressing C/D/I-sugar-tong splint in place  Compartments soft and compressible  +AIN/PIN/Ulnar/Median/Radial nerve function intact grossly  Unable to assess pulse due to sugar-tong splint in place there is good cap refill to the fingers and they are warm and perfused.  Distal sensory grossly intact C4-T1 dermatomes    CBC:   Lab Results   Component Value Date/Time    WBC 11.9 02/10/2024 05:32 AM    HGB 10.3 02/10/2024 05:32 AM    HCT 30.6 02/10/2024 05:32 AM     02/10/2024 05:32 AM     PT/INR:    Lab Results   Component Value Date/Time    PROTIME 13.0 02/06/2024 07:45 PM    INR 1.2 02/06/2024 07:45 PM       ASSESSMENT  Closed treatment radial styloid fracture  Closed treatment lunate dislocation    PLAN    Plan discussed with patient and family all questions answered to their satisfaction  Patient is nonweightbearing right upper extremity  Patient will be n.p.o. at 0015 on 2/11  Ancef on-call to the OR  Procedure consent signed  PT/OT as able  Medical management appreciated  Plan: OR on 2/11 with Dr. Soto for SL and LT ligament repair and carpal stabilization.  D/W attending  Electronically signed by Edmund Stephens DO on 2/10/2024 at 10:07 AM      
Department of Orthopedic Surgery  Resident Progress Note    Patient seen and examined.  He states his right wrist pain has been improving since reduction.  He is mostly complaining of back pain at this point.  Pain controlled. No new complaints.  Denies chest pain, shortness of breath, dizziness/lightheadedness.      VITALS:  BP (!) 148/100   Pulse 72   Temp 98.6 °F (37 °C)   Resp 18   Ht 1.778 m (5' 10\")   Wt 79.4 kg (175 lb)   SpO2 97%   BMI 25.11 kg/m²     General: Alert and in no acute distress    MUSCULOSKELETAL:   right upper extremity:  Dressing C/D/I sugar-tong splint in place  Median nerve sensation and motor intact  Compartments soft and compressible  +AIN/PIN/Ulnar/Median/Radial nerve function intact grossly  +2/4 Radial pulse, Cap refill <2 sec  Distal sensory grossly intact C4-T1 dermatomes    CBC:   Lab Results   Component Value Date/Time    WBC 12.3 02/06/2024 07:45 PM    HGB 12.0 02/06/2024 07:45 PM    HCT 35.3 02/06/2024 07:45 PM     02/06/2024 07:45 PM     PT/INR:    Lab Results   Component Value Date/Time    PROTIME 13.0 02/06/2024 07:45 PM    INR 1.2 02/06/2024 07:45 PM       ASSESSMENT  Right, radial styloid fracture  Right, lunate dislocation    PLAN    Nonweightbearing right upper extremity  Maintain splint clean dry and intact  Elevate ice reduce swelling and pain  No acute orthopedic intervention planned  Pain control DVT prophylaxis per primary team    I performed a history and physical exam of the patient, reviewed pertinent imaging, and discussed the patient's management with the resident. I reviewed the resident's note and agree with the documented findings and plan of care.    Pt to OR today neurosurgery for lumbar burst fx.    Right wrist xrays demonstrate satisfactory reduction of lunate dislocation.  No numbness in median nerve distribution.  Consult Dr Soto/ hand surgery re: further care.    Pt has some pain, echhymosis and tenderness over  right foot calcanoal 
Discussed home medications with patient. He does not recall medication names and dosages. Patients wife will be coming in today with medication list.   
INTRAOPERATIVE MONITORING EMG REPORT    Diagnosis: L1 burst fx. s/p fall from roof  Procedure: posterior fusion T11-L3   Anesthesia: isoflurane  Surgeon: Byron Hoover M.D.    Intra-op Monitorin hours    Procedure:     EMG recording electrodes were placed over the abdomin, adductors, vastus medialis, vastus lateralis, and anterior tibialis muscles for recording spontaneous EMG activity.  A silent control was performed to test the integrity of the system prior to the procedure.     Results:  Spontaneous EMG: was primarily quiet throughout the surgical procedure.     Evoked EMG:   LEFT    Direct Nerve (mA)            Screw (mA)   T11                                                      24  T12                                                      30  L1                                                      -  L2                                                      16  L3                       16      RIGHT     T11                                                      28  T12                                                      22  L1                                                      -  L2                                                      >30  L3                       29  
Kiel Choudhary was ordered tadalafil 5mg which is a nonformulary medication.  This medication will need to be supplied by the patient as the pharmacy does not carry this non-formulary medication.    If the medication has not been administered by 1400 on the following day from the time the order was placed, a pharmacist will follow-up with the nurse of the patient to assess the capability of the patient to bring in the medication.    If it is determined that the patient cannot supply the medication and it is not available to be dispensed from the pharmacy, the provider will be notified.    
[facility].    Daily Progress Note 2/10/2024    Date of admission:  2/6/2024    CC: Mechanical fall    Subjective:  Doing well overall. Pain much improved on medications. NO complains.  TLSO brace in place    Objective:  BP (!) 159/105   Pulse 86   Temp 99 °F (37.2 °C) (Oral)   Resp 16   Ht 1.778 m (5' 10\")   Wt 79.4 kg (175 lb)   SpO2 96%   BMI 25.11 kg/m²     GENERAL:  Laying in bed, awake, alert, cooperative, no apparent distress.     NEUROLOGIC:  GCS 15    HEAD: Normocephalic, atraumatic  EYES: No sclera icterus, pupils equal  LUNGS:  No increased work of breathing.  room air.  CARDIOVASCULAR:  RR  ABDOMEN:  Soft, non-tender, non-distended. Lumbar drain with  175 cc bloody output.  EXTREMITIES: No edema or swelling. RUE in splint and wrapped in ace wrap.   SKIN: Warm and dry    Assessment/Plan:  73 y.o. male s/p mechanical fall with unstable lumbar fx s/p fusion 2/7 and R styloid fx with lunate dislocation    Principal Problem:    Trauma  Active Problems:    Closed burst fracture of lumbar vertebra (HCC)    Head injury    Right foot injury, initial encounter    Closed fracture dislocation of lunate bone of right wrist    Closed fracture of right wrist  Resolved Problems:    * No resolved hospital problems. *      Neuro: Acute pain syndrome: Tylenol, oxy, Toradol, Robaxin  L1 vertebral body fracture involving 3 columns with 5 mm retropulsion and height loss: Maintain neutral spine.  Spine precautions.  Fusion 2/7. Lumbar drain management per NSG.  TLSO in place.  CV: HR near normal limits, no acute issues   Pulm: tolerating room air    GI: tolerating general diet   Renal: no acute issues.  Will remove Mlaik.  ID: afebrile, no acute issues     Endocrine: no acute issues,   MSK: Right styloid fracture and lunate dislocation status post reduction: Orthopedic surgery for SL LT lig repair stabilization...   Heme: no acute issues      DVT Prophylaxis: PCDs, Hold on chemical px until NSG is ok 
[facility].    Daily Progress Note 2/11/2024    Date of admission:  2/6/2024    CC: Mechanical fall    Subjective:  Moderate hand pain post op this AM    Objective:  BP (!) 141/79   Pulse 100   Temp 98 °F (36.7 °C)   Resp 20   Ht 1.778 m (5' 10\")   Wt 79.4 kg (175 lb)   SpO2 95%   BMI 25.11 kg/m²     GENERAL:  Laying in bed, awake, alert, cooperative, no apparent distress.     NEUROLOGIC:  GCS 15    HEAD: Normocephalic, atraumatic  EYES: No sclera icterus, pupils equal  LUNGS:  No increased work of breathing.  room air.  CARDIOVASCULAR:  RR  ABDOMEN:  Soft, non-tender, non-distended. Lumbar drain with  175 cc bloody output.  EXTREMITIES: No edema or swelling. RUE in splint and wrapped in ace wrap.   SKIN: Warm and dry    Assessment/Plan:  73 y.o. male s/p mechanical fall with unstable lumbar fx s/p fusion 2/7 and R styloid fx with lunate dislocation s/p OR for carpal ligament repair.     Principal Problem:    Trauma  Active Problems:    Closed burst fracture of lumbar vertebra (HCC)    Head injury    Right foot injury, initial encounter    Closed fracture dislocation of lunate bone of right wrist    Closed fracture of right wrist  Resolved Problems:    * No resolved hospital problems. *      Neuro: Acute pain syndrome: Tylenol, oxy, Toradol, Robaxin  L1 vertebral body fracture involving 3 columns with 5 mm retropulsion and height loss: Maintain neutral spine.  Spine precautions.  Fusion 2/7. Lumbar drain management per NSG.  TLSO in place.  CV: HR near normal limits, no acute issues   Pulm: tolerating room air    GI: tolerating general diet   Renal: no acute issues.  Will remove Malik.  ID: afebrile, no acute issues     Endocrine: no acute issues,   MSK: Right styloid fracture and lunate dislocation status post reduction: Orthopedic surgery for SL LT lig repair stabilization...   Heme: no acute issues      DVT Prophylaxis: PCDs, \heparin TID<   Ulcer Prophylaxis: Regular diet.    Tubes and 
Occupational Therapy  Attempted OT eval at [facility], however, pt has not yet received Brace.  RN at [facility] - reportedly reached out to Orthotist this afternoon.  Will attempt assessment next date.  Thank you for this referral JEANNE Saldivar, OTR/L  # [phone]    
Occupational Therapy  Attempted OT eval at [facility], however, TLSO Brace not yet available.  Will attempt OT assessment after brace has been placed by Orthotist.  Thank you for this referral JEANNE Saldivar, OTR/L  # [phone]    
Occupational Therapy  Attempted OT eval at [facility].  Brace not yet available.  Interviewed pt and wife re; Home set up/PLOF.  Spoke with RN at [facility] and Charge Nurse who reported Orthotists have been contacted several times re: brace.  Will follow closely and attempt assessment later this date.  Thank you for this referral JEANNE Saldivar, OTR/L  # [phone]    
Order and face sheet re-faxed to [facility] for TLSO brace. Electronically signed by Peri Benitez RN on 2/8/2024 at 12:11 PM    
Patient has refused of the removal of his Corrales catheter at this time. He has voiced that he would like one more day for his wrist to recover so he is able to use the urinal appropriately. Patient was educated on the importance of corrales removal when it is no longer needed, however, he has still denied.     Electronically signed by Homer Love RN on 2/11/2024 at 3:45 PM    
Physical Therapy    PT order received and medical chart reviewed 2/8. Awaiting soft TLSO brace. Spoke with pt and explained PT role. He understands need for brace when OOB. Will re-attempt later today. Thank you.    Ravinder Schultz, PT, DPT  [id number]       
Physical Therapy    PT order received and medical chart reviewed 2/9. Still awaiting TLSO. RN aware. Will re-attempt later today. Thank you.    Ravinder Schultz, PT, DPT  [id number]       
Physical Therapy  Initial Assessment     Name: Kiel Choudhary  : 1950  MRN: [id number]      Date of Service: 2024    Evaluating PT: Ravinder Schultz, PT, DPT [id number]      Room #:  4516/4516-B  Diagnosis:  Trauma [T14.90XA]  Injury of head, initial encounter [S09.90XA]  Closed fracture of right wrist, initial encounter [S62.101A]  Compression fracture of lumbar vertebra, unspecified lumbar vertebral level, initial encounter ([facility]) [S32.000A]  PMHx/PSHx:   has no past medical history on file.  Procedure/Surgery:    Right lunate dislocation closed reduction   T11-L3 posterior lumbar fusion   Precautions:  Fall risk, Spinal neutrality, Soft TLSO, R wrist fx, NWB R UE, Hemovac, Malik    SUBJECTIVE:    Pt lives with wife in a 2 story house with 1+1 stair(s) and no rail(s) to enter. Full flight of stairs and 1 rail to second floor bed and bath. Bathroom available on the first floor as well. Pt ambulated without AD prior to admission.    OBJECTIVE:   Initial Evaluation  Date: 24 Treatment Date: Short Term/ Long Term   Goals   AM-PAC 6 Clicks 10/24     Was pt agreeable to Eval/treatment? Yes     Does pt have pain? L mid back/flank pain     Bed Mobility  Rolling: ModA  Supine to sit: MaxA  Sit to supine: NT  Scooting: MaxA to EOB  Rolling: Supervision  Supine to sit: Supervision  Sit to supine: Supervision  Scooting: Supervision   Transfers Sit to stand: ModA  Stand to sit: ModA  Stand pivot: ModA with HHA  Sit to stand: Supervision  Stand to sit: Supervision  Stand pivot: Supervision with AAD   Ambulation   3 feet to chair with HHA with ModA  >200 feet with AAD with Supervision   Stair negotiation: ascended and descended NT  >12 step(s) with 1 rail(s) with SBA   ROM BUE: Refer to OT note  BLE: WFL     Strength BUE: Refer to OT note  BLE: WFL     Balance Sitting EOB: SBA  Dynamic Standing: Brayan with HHA  Sitting EOB: Independent  Dynamic Standing: Supervision with AAD     Pt is A & O x: 4 to person, place, 
Physical Therapy  Treatment Note    Name: Kiel Choudhary  : 1950  MRN: [id number]      Date of Service: 2024    Evaluating PT: Ravinder Schultz, PT, DPT [id number]      Room #:  4516/4516-B  Diagnosis:  Trauma [T14.90XA]  Injury of head, initial encounter [S09.90XA]  Closed fracture of right wrist, initial encounter [S62.101A]  Compression fracture of lumbar vertebra, unspecified lumbar vertebral level, initial encounter ([facility]) [S32.000A]  PMHx/PSHx:   has no past medical history on file.  Procedure/Surgery:    Right lunate dislocation closed reduction   T11-L3 posterior lumbar fusion   Right wrist scapholunate ligament repair with internal brace, lunate triquetrum ligament repair and carpal stabilization   Precautions:  Fall risk, Spinal neutrality, Soft TLSO, R wrist fx, NWB R UE, Hemovac, Malik    SUBJECTIVE:    Pt lives with wife in a 2 story house with 1+1 stair(s) and no rail(s) to enter. Full flight of stairs and 1 rail to second floor bed and bath. Bathroom available on the first floor as well. Pt ambulated without AD prior to admission.    OBJECTIVE:   Initial Evaluation  Date: 24 Treatment Date: 24 Short Term/ Long Term   Goals   AM-PAC 6 Clicks 10/24 18/24    Was pt agreeable to Eval/treatment? Yes Yes    Does pt have pain? L mid back/flank pain L flank/mid back pain    Bed Mobility  Rolling: ModA  Supine to sit: MaxA  Sit to supine: NT  Scooting: MaxA to EOB Rolling: SBA  Supine to sit: SBA  Sit to supine: NT  Scooting: SBA to EOB Rolling: Supervision  Supine to sit: Supervision  Sit to supine: Supervision  Scooting: Supervision   Transfers Sit to stand: ModA  Stand to sit: ModA  Stand pivot: ModA with HHA Sit to stand: SBA  Stand to sit: SBA  Stand pivot: SBA without AD Sit to stand: Supervision  Stand to sit: Supervision  Stand pivot: Supervision with AAD   Ambulation   3 feet to chair with HHA with ModA 250 feet without AD with SBA >200 feet with AAD with Supervision   Stair 
[facility].    Daily Progress Note 2/9/2024    Date of admission:  2/6/2024    CC: Mechanical fall    Subjective:  Doing well overall. Pain much improved on medications. NO complains.  Waiting for TLSO brace...     Objective:  BP (!) 159/98   Pulse 81   Temp 97.6 °F (36.4 °C) (Temporal)   Resp 18   Ht 1.778 m (5' 10\")   Wt 79.4 kg (175 lb)   SpO2 97%   BMI 25.11 kg/m²     GENERAL:  Laying in bed, awake, alert, cooperative, no apparent distress.     NEUROLOGIC:  GCS 15    HEAD: Normocephalic, atraumatic  EYES: No sclera icterus, pupils equal  LUNGS:  No increased work of breathing.  room air.  CARDIOVASCULAR:  RR  ABDOMEN:  Soft, non-tender, non-distended. Lumbar drain with  200 cc bloody output.  EXTREMITIES: No edema or swelling. RUE in splint and wrapped in ace wrap.   SKIN: Warm and dry    Assessment/Plan:  73 y.o. male s/p mechanical fall with unstable lumbar fx s/p fusion 2/7 and R styloid fx with lunate dislocation    Principal Problem:    Trauma  Active Problems:    Closed burst fracture of lumbar vertebra (HCC)    Head injury    Right foot injury, initial encounter    Closed fracture dislocation of lunate bone of right wrist    Closed fracture of right wrist  Resolved Problems:    * No resolved hospital problems. *      Neuro: Acute pain syndrome: Tylenol, oxy, Dilaudid, Toradol, Robaxin  L1 vertebral body fracture involving 3 columns with 5 mm retropulsion and height loss: Maintain neutral spine.  Spine precautions.  Fusion 2/7. Lumbar drain management per NSG. Waiting on TLSO.   CV: HR near normal limits, no acute issues   Pulm: tolerating room air    GI: tolerating general diet   Renal: no acute issues   ID: afebrile, no acute issues     Endocrine: no acute issues,   MSK: Right styloid fracture and lunate dislocation status post reduction: Orthopedic surgery consulted.  Reduction done in ED.  No interventions planned.  Heme: no acute issues      DVT Prophylaxis: PCDs, Hold on 
Sushma called regarding SAYDA [place]. Spoke with [facility]. Paperwork faxed to [phone]. Electronically signed by Narda Allen RN on 2/9/2024 at 9:06 AM    
While patient is still in house he has been placed on the schedule for SL and LT carpal ligament repair with internal bracing with Dr Soto.    Formal progress note to follow tomorrow    Plan:  ARIAS CHO  Npo order for 0015 on 2/11/24 placed  Procedure consent ordered  Ancef on call to OR  Above listed procedure with Dr Bashir Soto on 2/11/24  
X ray at bedside.  
PRN  methocarbamol (ROBAXIN) tablet 1,000 mg, 1,000 mg, Oral, 4x Daily  polyethylene glycol (GLYCOLAX) packet 17 g, 17 g, Oral, Daily  sennosides-docusate sodium (SENOKOT-S) 8.6-50 MG tablet 1 tablet, 1 tablet, Oral, BID  sodium chloride flush 0.9 % injection 5-40 mL, 5-40 mL, IntraVENous, 2 times per day  sodium chloride flush 0.9 % injection 5-40 mL, 5-40 mL, IntraVENous, PRN  0.9 % sodium chloride infusion, , IntraVENous, PRN  acetaminophen (TYLENOL) tablet 650 mg, 650 mg, Oral, Q6H  ondansetron (ZOFRAN-ODT) disintegrating tablet 4 mg, 4 mg, Oral, Q8H PRN **OR** ondansetron (ZOFRAN) injection 4 mg, 4 mg, IntraVENous, Q6H PRN  ceFAZolin (ANCEF) 2,000 mg in sterile water 20 mL IV syringe, 2,000 mg, IntraVENous, Q8H  oxyCODONE (ROXICODONE) immediate release tablet 5 mg, 5 mg, Oral, Q4H PRN **OR** oxyCODONE HCl (OXY-IR) immediate release tablet 10 mg, 10 mg, Oral, Q4H PRN    ASSESSMENT:   S/p T11-L3 fusion for L1 burst fracture on 2/7    PLAN:  Pain control  PT/OT with TLSO  TLSO x 3 months  Drain care-plan to pull on Saturday  Follow up in clinic in 2 weeks for staple removal and in 4 weeks with XR       Electronically signed by HELEN Gutierrez on 2/8/2024 at 4:06 PM   
starting it   Ulcer Prophylaxis: Regular diet.    Tubes and Lines:  Peripheral  Ancillary consults:   NSG, Ortho    Family Update:         As available   CODE Status:   Full code  Dispo:PT/OT    Fabrizio Larsen DO      Attending Attestation   I saw and examined the patient, I agree with resident note      Hx taken from patient    I personally reviewed the NS op noted, T11-L3 fusion      74 yo s/p fall off a roof 10 feet with acute L1 burst fx now s/p fusion, closed R wrist radial sytloid fx  lunate dislocation.        I am managing prescription drugs, IV dilaudid. Robaxin, roxicodone,        Santi Sharp MD FACS          
contrast enhanced MRI if clinically indicated.         CT HEAD WO CONTRAST   Final Result   No acute intracranial abnormality.         CT LUMBAR SPINE WO CONTRAST   Final Result   Acute L1 vertebral body fracture involving all 3 columns. Approximately 25%   loss of height and 5 mm of retropulsion.  Likely mild narrowing of the thecal   sac at this level.  Fracture involves the right lamina and bilateral   transverse processes.  Would question a small ventral epidural hematoma at   this level.      No acute intrathoracic process.      Left adrenal mass measuring 3 cm. This is somewhat low in attenuation and may   represent an adrenal adenoma.      Extensive colonic diverticulosis.      Small periumbilical hernia which contains fat and a minimal portion of small   bowel. No bowel obstruction.      Small hiatal hernia.      Unusual appearing duodenal diverticulum, perhaps duplication cyst.      Markedly enlarged prostate. Correlate with PSA.      Dilatation of the ascending thoracic aorta measuring 3.9 cm.      Subtle 1.1 cm slightly hypoattenuating focus in the pancreatic head. This is   of relatively doubtful significance but could better evaluate for possibility   of lesion with contrast enhanced MRI if clinically indicated.         CT THORACIC SPINE WO CONTRAST   Final Result   Acute L1 vertebral body fracture involving all 3 columns. Approximately 25%   loss of height and 5 mm of retropulsion.  Likely mild narrowing of the thecal   sac at this level.  Fracture involves the right lamina and bilateral   transverse processes.  Would question a small ventral epidural hematoma at   this level.      No acute intrathoracic process.      Left adrenal mass measuring 3 cm. This is somewhat low in attenuation and may   represent an adrenal adenoma.      Extensive colonic diverticulosis.      Small periumbilical hernia which contains fat and a minimal portion of small   bowel. No bowel obstruction.      Small hiatal hernia. 
from EOB 2x, Sitting in Armed Chair  Pt ed for safety/hand placement     SBA- sit<->stand  Cuing for hand placement and body mechanics Mod I      Functional Mobility Mod A w/ hand-held assist     Short distances along EOB, b/t surfaces, limited by weakness, dizziness/lightheadedness  Pt ed for safety/improved safety awareness    SBA  Home distance no AD  Mod I      Balance Sitting:     Static:  Close SUP EOB/Chair    Dynamic:  Close SUP w/ functional ax EOB/Chair      Standing:     Static:  Mod A w/o AD    Dynamic:  Mod A w/ functional ax/mobility w/o AD     Sitting:     Static:  Ind    Dynamic:  Sup     Standing:     Static:  SBA    Dynamic: SBA Sitting:     Static:  IND    Dynamic:  IND w/ functional ax     Standing:     Static:  Mod I w/ AD PRN    Dynamic:  Mod I w/ functional ax/mobility w/ AD PRN   Activity Tolerance Fair     Sitting Tolerance w/ light ax > 20 mins EOB, extended time during/after session in chair  Standing Tolerance w/ light ax ~ 1 min + 5 mins w/ extended rest breaks b/t trials    Fair+  Good(-)   Visual/  Perceptual     Hearing: WNL   Glasses: No     WFL   Hearing Aids:  No                         Comments: Upon arrival pt supine in bed. Educated pt on spinal precautions, demonstrates fair+ understanding. Pt educated on adaptive techniques to increase independence and safety during ADL's, bed mobility, and functional transfers while maintaining precautions. Discussed home set up with pt, giving suggestions to increase safety at discharge. At end of session pt left seated in bedside chair, call light within reach, wife present.     Pt has made fair+ progress towards set goals.     Continue with current plan of care    Treatment Time In: 9:05            Treatment Time Out: 9:30             Treatment Charges: Mins Units   Ther Ex  59565     Manual Therapy 11462     Thera Activities 15385 10 1   ADL/Home Mgt 08911 15 1   Neuro Re-ed [id number]     Group Therapy      Orthotic manage/training  [id number]   
during completion of ADL/functional transfer/mobility tasks.  Pt would benefit from continued skilled OT to increase safety and independence with completion of ADL/IADL tasks for functional independence and quality of life.    Treatment: OT treatment provided this date includes:   Instruction/training on safety and adapted techniques for completion of ADLs, use of DME/AD/Adaptive equip;  within precautions   Instruction/training on safe functional mobility/transfer techniques, use of DME/AD;  within precautions      Instruction/training on energy conservation techs (EC)/Work Simplification/PLB for completion of ADLs:     Neuromuscular Reeducation to facilitate balance/righting reactions for increased function with ADLs:    Skilled positioning/alignment for Pain Mgmt, Skin Integrity, Edema Control, to maximize Pt's safety and ability to safely and INDly interact w/ his/her environment, maximize respiratory status  Activity tolerance - Sitting/Standing to improve endurance w/ functional ax   Cognitive retraining -  Oriented pt to current Date, Place and Situation; Cues for safety/safety awareness, sequencing, problem solving    Skilled monitoring of Vitals during session and pt's response to tx ax      Pt/family ed re: benefits of participate in post-acute therapy program;SNF vs  OT    Consulted RN, PT - Session completed in collaboration w/ PT for pt's safety, Family, Orthotist    Made all appropriate Environmental Modifications to facilitate pt's level of IND and safety.    Recommendations for Continued Participation in OT services during Hospitalization and at D/C - SNF vs  OT w/ 24/7 SUP/Assist of family    Pt and/or Family verbalized/demonstrated a Good(-) understanding of education provided.  Will Review PRN.         Rehab Potential: Good for established goals     Patient / Family Goal: Not stated at this time      Patient and/or family were instructed on functional diagnosis, prognosis/goals and OT plan of

## 2024-02-12 NOTE — PLAN OF CARE
Problem: Discharge Planning  Goal: Discharge to home or other facility with appropriate resources  Outcome: Progressing     Problem: Pain  Goal: Verbalizes/displays adequate comfort level or baseline comfort level  Outcome: Progressing     Problem: Safety - Adult  Goal: Free from fall injury  Outcome: Progressing     Problem: Skin/Tissue Integrity  Goal: Absence of new skin breakdown  Description: 1.  Monitor for areas of redness and/or skin breakdown  2.  Assess vascular access sites hourly  3.  Every 4-6 hours minimum:  Change oxygen saturation probe site  4.  Every 4-6 hours:  If on nasal continuous positive airway pressure, respiratory therapy assess nares and determine need for appliance change or resting period.  Outcome: Progressing     Problem: ABCDS Injury Assessment  Goal: Absence of physical injury  Outcome: Progressing     Problem: Neurosensory - Adult  Goal: Achieves stable or improved neurological status  Outcome: Progressing  Goal: Achieves maximal functionality and self care  Outcome: Progressing     Problem: Skin/Tissue Integrity - Adult  Goal: Skin integrity remains intact  Outcome: Progressing  Goal: Incisions, wounds, or drain sites healing without S/S of infection  Outcome: Progressing     Problem: Musculoskeletal - Adult  Goal: Return mobility to safest level of function  Outcome: Progressing  Goal: Maintain proper alignment of affected body part  Outcome: Progressing  Goal: Return ADL status to a safe level of function  Outcome: Progressing     Problem: Genitourinary - Adult  Goal: Urinary catheter remains patent  Outcome: Progressing      unknown

## 2024-02-12 NOTE — PLAN OF CARE
Problem: Discharge Planning  Goal: Discharge to home or other facility with appropriate resources  2/12/2024 1006 by Zo Grossman RN  Outcome: Progressing  2/11/2024 2256 by Trevor Amaral, RN  Outcome: Progressing     Problem: Pain  Goal: Verbalizes/displays adequate comfort level or baseline comfort level  2/12/2024 1006 by Zo Grossman RN  Outcome: Progressing  2/11/2024 2256 by Trevor Amaral, RN  Outcome: Progressing

## 2024-02-21 ENCOUNTER — OFFICE VISIT (OUTPATIENT)
Dept: NEUROSURGERY | Age: 74
End: 2024-02-21
Payer: MEDICARE

## 2024-02-21 DIAGNOSIS — Z98.1 S/P LUMBAR FUSION: Primary | ICD-10-CM

## 2024-02-21 PROCEDURE — 99212 OFFICE O/P EST SF 10 MIN: CPT

## 2024-02-21 PROCEDURE — 99024 POSTOP FOLLOW-UP VISIT: CPT | Performed by: STUDENT IN AN ORGANIZED HEALTH CARE EDUCATION/TRAINING PROGRAM

## 2024-02-21 NOTE — PROGRESS NOTES
Encounter for Staple Removal     Kiel Choudhary is a 73 y.o.  male  two weeks s/p T11-L3 fusion for L1 burst fracture      Patient presents for staple removal.      Equipment: General staple removal kit, ChloraPrep, sterile gloves     Procedure: Pt was placed in the sitting position. Using a sterile technique, ChloraPrep was used to clean the incisional wound. The wound healing well without signs of infection. Staples were removed with no pain and no complications. Pt tolerated procedure well. Pts questions were answered and wound care instructions and restrictions were discussed. Pt is to return to neurosurgery clinic in 2 weeks for surgery follow-up or sooner if new issues or concerns arise.

## 2024-03-05 DIAGNOSIS — Z98.1 S/P LUMBAR FUSION: Primary | ICD-10-CM

## 2024-03-06 ENCOUNTER — HOSPITAL ENCOUNTER (OUTPATIENT)
Age: 74
Discharge: HOME | End: 2024-03-08
Payer: MEDICARE

## 2024-03-06 ENCOUNTER — HOSPITAL ENCOUNTER (OUTPATIENT)
Dept: GENERAL RADIOLOGY | Age: 74
Discharge: HOME | End: 2024-03-08
Payer: MEDICARE

## 2024-03-06 ENCOUNTER — OFFICE VISIT (OUTPATIENT)
Dept: NEUROSURGERY | Age: 74
End: 2024-03-06
Payer: MEDICARE

## 2024-03-06 DIAGNOSIS — Z98.1 S/P LUMBAR FUSION: Primary | ICD-10-CM

## 2024-03-06 DIAGNOSIS — Z98.1 S/P LUMBAR FUSION: ICD-10-CM

## 2024-03-06 DIAGNOSIS — S32.001S CLOSED BURST FRACTURE OF LUMBAR VERTEBRA, SEQUELA: ICD-10-CM

## 2024-03-06 PROCEDURE — 99024 POSTOP FOLLOW-UP VISIT: CPT | Performed by: STUDENT IN AN ORGANIZED HEALTH CARE EDUCATION/TRAINING PROGRAM

## 2024-03-06 PROCEDURE — 99212 OFFICE O/P EST SF 10 MIN: CPT

## 2024-03-06 PROCEDURE — 72100 X-RAY EXAM L-S SPINE 2/3 VWS: CPT

## 2024-03-06 NOTE — PROGRESS NOTES
Post-Operative Follow-up     This is a 73 year old male who presents to the office for a 1 month follow-up s/p T11-L3 fusion for L1 burst fracture     Subjective: Patient states he is doing well. He admits to some soreness, but overall no significant back pain. He denies any pain down his legs. No numbness or weakness. Brace complaint. XR reviewed.       Physical Exam:              WDWN, no apparent distress              Non-labored breathing               Vitals Stable              Alert and oriented x3              CN 3-12 intact              PERRL              EOMI              FRANCO well              Motor strength symmetric              Sensation to LT intact bilaterally'   Incision healing well without signs of infection   In TLSO          Imaging: 3/6/2024 XR Lumbar Spine  Stable alignment, stable fusion. No acute abnormalities noted. Final report pending.     Assessment: This is a 73 y.o.  male presenting for a 1 month follow-up s/p T11-L3 fusion for L1 burst fracture       Plan:  -Pain control and expectations discussed  -Continue brace and restrictions x 2 more months   -OARRS report reviewed   -Follow-up in neurosurgery clinic in 2 months with repeat XR  -Call or return to neurosurgery office sooner if symptoms worsen or if new issues arise in the interim.    Electronically signed by Rosamaria Pearce PA-C on 3/6/2024 at 5:01 PM

## 2024-05-01 ENCOUNTER — HOSPITAL ENCOUNTER (OUTPATIENT)
Dept: GENERAL RADIOLOGY | Age: 74
Discharge: HOME | End: 2024-05-03
Payer: MEDICARE

## 2024-05-01 ENCOUNTER — HOSPITAL ENCOUNTER (OUTPATIENT)
Age: 74
Discharge: HOME | End: 2024-05-03
Payer: MEDICARE

## 2024-05-01 ENCOUNTER — OFFICE VISIT (OUTPATIENT)
Dept: NEUROSURGERY | Age: 74
End: 2024-05-01
Payer: MEDICARE

## 2024-05-01 DIAGNOSIS — Z98.1 S/P LUMBAR FUSION: Primary | ICD-10-CM

## 2024-05-01 DIAGNOSIS — S32.001S CLOSED BURST FRACTURE OF LUMBAR VERTEBRA, SEQUELA: ICD-10-CM

## 2024-05-01 DIAGNOSIS — Z98.1 S/P LUMBAR FUSION: ICD-10-CM

## 2024-05-01 PROCEDURE — 99024 POSTOP FOLLOW-UP VISIT: CPT | Performed by: PHYSICIAN ASSISTANT

## 2024-05-01 PROCEDURE — 72100 X-RAY EXAM L-S SPINE 2/3 VWS: CPT

## 2024-05-01 PROCEDURE — 99212 OFFICE O/P EST SF 10 MIN: CPT

## 2024-05-01 NOTE — PROGRESS NOTES
Post Operative Follow-up    Patient is status post: lumbar fusion for L1 fracture 3 months ago.    Physical Exam  Alert and Oriented X 3  WILMA RAMIREZ 5/5  Wound: C/D/I    A/P: patient is s/p T11-L3 fusion 3 months ago.  X-rays stable, no restrictions, will begin PT.

## 2025-01-22 DIAGNOSIS — Z98.1 S/P LUMBAR FUSION: Primary | ICD-10-CM

## 2025-06-29 ENCOUNTER — HOSPITAL ENCOUNTER (EMERGENCY)
Dept: HOSPITAL 83 - ED | Age: 75
Discharge: HOME | End: 2025-06-29
Payer: MEDICARE

## 2025-06-29 VITALS — WEIGHT: 175 LBS | BODY MASS INDEX: 25.05 KG/M2 | HEIGHT: 70 IN

## 2025-06-29 DIAGNOSIS — Z87.442: ICD-10-CM

## 2025-06-29 DIAGNOSIS — Z88.0: ICD-10-CM

## 2025-06-29 DIAGNOSIS — Z79.899: ICD-10-CM

## 2025-06-29 DIAGNOSIS — Z90.49: ICD-10-CM

## 2025-06-29 DIAGNOSIS — N13.2: Primary | ICD-10-CM

## 2025-06-29 DIAGNOSIS — Z98.890: ICD-10-CM

## 2025-06-29 LAB
ALP SERPL-CCNC: 86 U/L (ref 46–116)
ALT SERPL W P-5'-P-CCNC: 20 U/L (ref 5–49)
APPEARANCE UR: CLEAR
BACTERIA #/AREA URNS HPF: (no result) /[HPF]
BASOPHILS # BLD AUTO: 0.1 10*3/UL (ref 0–0.1)
BASOPHILS NFR BLD AUTO: 0.5 % (ref 0–1)
BILIRUB UR QL STRIP: NEGATIVE
BUN SERPL-MCNC: 14 MG/DL (ref 9–23)
CASTS URNS QL MICRO: (no result)
CHLORIDE SERPL-SCNC: 106 MMOL/L (ref 98–107)
COLOR UR: YELLOW
CRYSTALS URNS MICRO: (no result)
EOSINOPHIL # BLD AUTO: 0.3 10*3/UL (ref 0–0.4)
EOSINOPHIL # BLD AUTO: 3.4 % (ref 1–4)
EPI CELLS #/AREA URNS HPF: (no result) /[HPF]
ERYTHROCYTE [DISTWIDTH] IN BLOOD BY AUTOMATED COUNT: 12.9 % (ref 0–14.5)
GLUCOSE UR QL: NEGATIVE
HCT VFR BLD AUTO: 43.5 % (ref 42–52)
HGB UR QL STRIP: (no result)
KETONES UR QL STRIP: NEGATIVE
LEUKOCYTE ESTERASE UR QL STRIP: NEGATIVE
MANUAL DIFFERENTIAL PERFORMED BLD QL: (no result)
MCH RBC QN AUTO: 30.1 PG (ref 27–31)
MCHC RBC AUTO-ENTMCNC: 33.6 G/DL (ref 33–37)
MCV RBC AUTO: 89.7 FL (ref 80–94)
MONOCYTES # BLD AUTO: 0.5 10*3/UL (ref 0.1–1)
MONOCYTES NFR BLD MANUAL: 5.8 % (ref 3–9)
MUCOUS THREADS URNS QL MICRO: (no result)
NEUT #: 6.6 10*3/UL (ref 2.3–7.9)
NEUTROPHILS NFR BLD AUTO: 71.4 % (ref 47–73)
NITRITE UR QL STRIP: NEGATIVE
NRBC BLD QL AUTO: 0 10*3/UL (ref 0–0)
PH UR STRIP: 8 [PH] (ref 4.5–8)
PLATELET # BLD AUTO: 187 10*3/UL (ref 130–400)
PMV BLD AUTO: 10.7 FL (ref 9.6–12.3)
POTASSIUM SERPL-SCNC: 3.3 MMOL/L (ref 3.4–5.1)
PROT SERPL-MCNC: 7.7 GM/DL (ref 6–8)
RBC # BLD AUTO: 4.85 10*6/UL (ref 4.5–5.9)
SP GR UR: 1.01 (ref 1–1.03)
T VAGINALIS URNS QL MICRO: (no result)
UROBILINOGEN UR STRIP-MCNC: 0.2 E.U./DL (ref 0–1)
WBC NRBC COR # BLD AUTO: 9.2 10*3/UL (ref 4.8–10.8)
YEAST #/AREA URNS HPF: (no result) /[HPF]

## 2025-07-26 ENCOUNTER — HOSPITAL ENCOUNTER (EMERGENCY)
Dept: HOSPITAL 83 - ED | Age: 75
Discharge: HOME | End: 2025-07-26
Payer: MEDICARE

## 2025-07-26 VITALS — BODY MASS INDEX: 24.62 KG/M2 | HEIGHT: 70 IN | WEIGHT: 172 LBS

## 2025-07-26 DIAGNOSIS — Z87.442: ICD-10-CM

## 2025-07-26 DIAGNOSIS — Z88.0: ICD-10-CM

## 2025-07-26 DIAGNOSIS — Z79.899: ICD-10-CM

## 2025-07-26 DIAGNOSIS — N13.2: Primary | ICD-10-CM

## 2025-07-26 DIAGNOSIS — Z98.890: ICD-10-CM

## 2025-07-26 DIAGNOSIS — R33.9: ICD-10-CM

## 2025-07-26 LAB
APPEARANCE UR: CLEAR
BACTERIA #/AREA URNS HPF: (no result) /[HPF]
BASOPHILS # BLD AUTO: 0.1 10*3/UL (ref 0–0.1)
BASOPHILS NFR BLD AUTO: 0.8 % (ref 0–1)
BILIRUB UR QL STRIP: NEGATIVE
BUN SERPL-MCNC: 10 MG/DL (ref 9–23)
CASTS URNS QL MICRO: (no result)
CHLORIDE SERPL-SCNC: 105 MMOL/L (ref 98–107)
COLOR UR: (no result)
CRYSTALS URNS MICRO: (no result)
EOSINOPHIL # BLD AUTO: 0.1 10*3/UL (ref 0–0.4)
EOSINOPHIL # BLD AUTO: 2.4 % (ref 1–4)
EPI CELLS #/AREA URNS HPF: (no result) /[HPF]
ERYTHROCYTE [DISTWIDTH] IN BLOOD BY AUTOMATED COUNT: 12.4 % (ref 0–14.5)
GLUCOSE UR QL: NEGATIVE
HCT VFR BLD AUTO: 40.4 % (ref 42–52)
HGB UR QL STRIP: (no result)
KETONES UR QL STRIP: NEGATIVE
LEUKOCYTE ESTERASE UR QL STRIP: NEGATIVE
MANUAL DIFFERENTIAL PERFORMED BLD QL: (no result)
MCH RBC QN AUTO: 29.8 PG (ref 27–31)
MCHC RBC AUTO-ENTMCNC: 33.9 G/DL (ref 33–37)
MCV RBC AUTO: 87.8 FL (ref 80–94)
MONOCYTES # BLD AUTO: 0.3 10*3/UL (ref 0.1–1)
MONOCYTES NFR BLD MANUAL: 5.8 % (ref 3–9)
MUCOUS THREADS URNS QL MICRO: (no result)
NEUT #: 4.4 10*3/UL (ref 2.3–7.9)
NEUTROPHILS NFR BLD AUTO: 74.4 % (ref 47–73)
NITRITE UR QL STRIP: NEGATIVE
NRBC BLD QL AUTO: 0 % (ref 0–0)
PH UR STRIP: 6 [PH] (ref 4.5–8)
PLATELET # BLD AUTO: 208 10*3/UL (ref 130–400)
PMV BLD AUTO: 10.5 FL (ref 9.6–12.3)
POTASSIUM SERPL-SCNC: 3.5 MMOL/L (ref 3.4–5.1)
RBC # BLD AUTO: 4.6 10*6/UL (ref 4.5–5.9)
RBC #/AREA URNS HPF: (no result) RBC/HPF (ref 0–2)
SP GR UR: 1.01 (ref 1–1.03)
T VAGINALIS URNS QL MICRO: (no result)
UROBILINOGEN UR STRIP-MCNC: 0.2 E.U./DL (ref 0–1)
WBC NRBC COR # BLD AUTO: 5.9 10*3/UL (ref 4.8–10.8)
YEAST #/AREA URNS HPF: (no result) /[HPF]

## (undated) DEVICE — KIT EVAC 400CC DIA1/8IN H PAT 12.5IN 3 SPR RND SHP PVC DRN

## (undated) DEVICE — HOLSTER ES DISP FOR FLAT SURF TO SHLD SUCT COAG VALLEYLAB

## (undated) DEVICE — GLOVE SURG 8.5 PF POLYMER WHT STRL SIGN LTX ESSENTIAL LTX

## (undated) DEVICE — SPHERE EYE 1 MRK GUIDANCE PASS STEALTHSTATION 1PK/EA

## (undated) DEVICE — 3M(TM) MEDIPORE(TM) +PAD SOFT CLOTH ADHESIVE WOUND DRESSING 3570: Brand: 3M™ MEDIPORE™

## (undated) DEVICE — ZIMMER® STERILE DISPOSABLE TOURNIQUET CUFF WITH PROTECTIVE SLEEVE AND PLC, DUAL PORT, SINGLE BLADDER, 18 IN. (46 CM)

## (undated) DEVICE — SHEET,DRAPE,40X58,STERILE: Brand: MEDLINE

## (undated) DEVICE — JACKSON TABLE POSITIONER KIT: Brand: MEDLINE INDUSTRIES, INC.

## (undated) DEVICE — BOWL ASSY BM210 DUAL BLADE DISPOSABLE: Brand: MIDAS REX™

## (undated) DEVICE — 3M™ IOBAN™ 2 ANTIMICROBIAL INCISE DRAPE 6650EZ: Brand: IOBAN™ 2

## (undated) DEVICE — SUTURE VCRL + SZ 2-0 L27IN ABSRB CLR CT-1 1/2 CIR TAPERCUT VCP259H

## (undated) DEVICE — TOWEL,OR,DSP,ST,BLUE,STD,6/PK,12PK/CS: Brand: MEDLINE

## (undated) DEVICE — ELECTRODE PT RET AD L9FT HI MOIST COND ADH HYDRGEL CORDED

## (undated) DEVICE — HYPODERMIC SAFETY NEEDLE: Brand: MAGELLAN

## (undated) DEVICE — SURGICAL PROCEDURE PACK HND

## (undated) DEVICE — SYRINGE 20ML LL S/C 50

## (undated) DEVICE — BLADE ES L6IN ELASTOMERIC COAT EXT DURABLE BEND UPTO 90DEG

## (undated) DEVICE — PENCIL ES L3M BTTN SWCH HOLSTER W/ BLDE ELECTRD EDGE

## (undated) DEVICE — GOWN,SIRUS,FABRNF,L,20/CS: Brand: MEDLINE

## (undated) DEVICE — SOLUTION SURG PREP 26 CC PURPREP

## (undated) DEVICE — NEEDLE SPNL L3.5IN PNK HUB S STL REG WALL FIT STYL W/ QNCKE

## (undated) DEVICE — DRAPE,REIN 53X77,STERILE: Brand: MEDLINE

## (undated) DEVICE — MARKER SURG PASS SPHR NDI

## (undated) DEVICE — LUMBAR LAMINECTOMY: Brand: MEDLINE INDUSTRIES, INC.

## (undated) DEVICE — PREMIUM WET SKIN PREP TRAY: Brand: MEDLINE INDUSTRIES, INC.

## (undated) DEVICE — 5.0MM PRECISION ROUND

## (undated) DEVICE — GLOVE ORANGE PI 8   MSG9080

## (undated) DEVICE — DRAPE CARM MINI FOR IMAG SYS INSIGHT FLROSCN

## (undated) DEVICE — 4-PORT MANIFOLD: Brand: NEPTUNE 2

## (undated) DEVICE — TUBING SUCT 12FR MAL ALUM SHFT FN CAP VENT UNIV CONN W/ OBT

## (undated) DEVICE — GLOVE SURG SZ 65 THK91MIL LTX FREE SYN POLYISOPRENE